# Patient Record
Sex: MALE | Race: OTHER | NOT HISPANIC OR LATINO
[De-identification: names, ages, dates, MRNs, and addresses within clinical notes are randomized per-mention and may not be internally consistent; named-entity substitution may affect disease eponyms.]

---

## 2020-06-12 ENCOUNTER — TRANSCRIPTION ENCOUNTER (OUTPATIENT)
Age: 40
End: 2020-06-12

## 2020-07-21 ENCOUNTER — TRANSCRIPTION ENCOUNTER (OUTPATIENT)
Age: 40
End: 2020-07-21

## 2020-12-04 ENCOUNTER — TRANSCRIPTION ENCOUNTER (OUTPATIENT)
Age: 40
End: 2020-12-04

## 2021-06-09 ENCOUNTER — INPATIENT (INPATIENT)
Facility: HOSPITAL | Age: 41
LOS: 1 days | Discharge: HOME | DRG: 65 | End: 2021-06-11
Attending: PSYCHIATRY & NEUROLOGY | Admitting: PSYCHIATRY & NEUROLOGY
Payer: COMMERCIAL

## 2021-06-09 VITALS
HEART RATE: 73 BPM | RESPIRATION RATE: 20 BRPM | TEMPERATURE: 98 F | SYSTOLIC BLOOD PRESSURE: 113 MMHG | DIASTOLIC BLOOD PRESSURE: 76 MMHG | WEIGHT: 179.9 LBS | HEIGHT: 67 IN | OXYGEN SATURATION: 97 %

## 2021-06-09 DIAGNOSIS — R26.9 UNSPECIFIED ABNORMALITIES OF GAIT AND MOBILITY: ICD-10-CM

## 2021-06-09 LAB
ALBUMIN SERPL ELPH-MCNC: 4.6 G/DL — SIGNIFICANT CHANGE UP (ref 3.3–5)
ALP SERPL-CCNC: 65 U/L — SIGNIFICANT CHANGE UP (ref 40–120)
ALT FLD-CCNC: 23 U/L — SIGNIFICANT CHANGE UP (ref 10–45)
ANION GAP SERPL CALC-SCNC: 14 MMOL/L — SIGNIFICANT CHANGE UP (ref 5–17)
APTT BLD: 30.2 SEC — SIGNIFICANT CHANGE UP (ref 27.5–35.5)
AST SERPL-CCNC: 22 U/L — SIGNIFICANT CHANGE UP (ref 10–40)
BASOPHILS # BLD AUTO: 0.02 K/UL — SIGNIFICANT CHANGE UP (ref 0–0.2)
BASOPHILS NFR BLD AUTO: 0.3 % — SIGNIFICANT CHANGE UP (ref 0–2)
BILIRUB SERPL-MCNC: 0.6 MG/DL — SIGNIFICANT CHANGE UP (ref 0.2–1.2)
BUN SERPL-MCNC: 16 MG/DL — SIGNIFICANT CHANGE UP (ref 7–23)
CALCIUM SERPL-MCNC: 9.5 MG/DL — SIGNIFICANT CHANGE UP (ref 8.4–10.5)
CHLORIDE SERPL-SCNC: 101 MMOL/L — SIGNIFICANT CHANGE UP (ref 96–108)
CO2 SERPL-SCNC: 24 MMOL/L — SIGNIFICANT CHANGE UP (ref 22–31)
CREAT SERPL-MCNC: 1.01 MG/DL — SIGNIFICANT CHANGE UP (ref 0.5–1.3)
EOSINOPHIL # BLD AUTO: 0.03 K/UL — SIGNIFICANT CHANGE UP (ref 0–0.5)
EOSINOPHIL NFR BLD AUTO: 0.5 % — SIGNIFICANT CHANGE UP (ref 0–6)
GLUCOSE SERPL-MCNC: 84 MG/DL — SIGNIFICANT CHANGE UP (ref 70–99)
HCT VFR BLD CALC: 41.7 % — SIGNIFICANT CHANGE UP (ref 39–50)
HGB BLD-MCNC: 14.7 G/DL — SIGNIFICANT CHANGE UP (ref 13–17)
IMM GRANULOCYTES NFR BLD AUTO: 0.2 % — SIGNIFICANT CHANGE UP (ref 0–1.5)
INR BLD: 1.07 RATIO — SIGNIFICANT CHANGE UP (ref 0.88–1.16)
LYMPHOCYTES # BLD AUTO: 1.45 K/UL — SIGNIFICANT CHANGE UP (ref 1–3.3)
LYMPHOCYTES # BLD AUTO: 25 % — SIGNIFICANT CHANGE UP (ref 13–44)
MCHC RBC-ENTMCNC: 29.9 PG — SIGNIFICANT CHANGE UP (ref 27–34)
MCHC RBC-ENTMCNC: 35.3 GM/DL — SIGNIFICANT CHANGE UP (ref 32–36)
MCV RBC AUTO: 84.8 FL — SIGNIFICANT CHANGE UP (ref 80–100)
MONOCYTES # BLD AUTO: 0.35 K/UL — SIGNIFICANT CHANGE UP (ref 0–0.9)
MONOCYTES NFR BLD AUTO: 6 % — SIGNIFICANT CHANGE UP (ref 2–14)
NEUTROPHILS # BLD AUTO: 3.94 K/UL — SIGNIFICANT CHANGE UP (ref 1.8–7.4)
NEUTROPHILS NFR BLD AUTO: 68 % — SIGNIFICANT CHANGE UP (ref 43–77)
NRBC # BLD: 0 /100 WBCS — SIGNIFICANT CHANGE UP (ref 0–0)
PLATELET # BLD AUTO: 240 K/UL — SIGNIFICANT CHANGE UP (ref 150–400)
POTASSIUM SERPL-MCNC: 3.9 MMOL/L — SIGNIFICANT CHANGE UP (ref 3.5–5.3)
POTASSIUM SERPL-SCNC: 3.9 MMOL/L — SIGNIFICANT CHANGE UP (ref 3.5–5.3)
PROT SERPL-MCNC: 7.2 G/DL — SIGNIFICANT CHANGE UP (ref 6–8.3)
PROTHROM AB SERPL-ACNC: 12.8 SEC — SIGNIFICANT CHANGE UP (ref 10.6–13.6)
RBC # BLD: 4.92 M/UL — SIGNIFICANT CHANGE UP (ref 4.2–5.8)
RBC # FLD: 12.5 % — SIGNIFICANT CHANGE UP (ref 10.3–14.5)
SARS-COV-2 RNA SPEC QL NAA+PROBE: SIGNIFICANT CHANGE UP
SODIUM SERPL-SCNC: 139 MMOL/L — SIGNIFICANT CHANGE UP (ref 135–145)
TROPONIN T, HIGH SENSITIVITY RESULT: <6 NG/L — SIGNIFICANT CHANGE UP (ref 0–51)
WBC # BLD: 5.8 K/UL — SIGNIFICANT CHANGE UP (ref 3.8–10.5)
WBC # FLD AUTO: 5.8 K/UL — SIGNIFICANT CHANGE UP (ref 3.8–10.5)

## 2021-06-09 PROCEDURE — 70450 CT HEAD/BRAIN W/O DYE: CPT | Mod: 26,MA,59

## 2021-06-09 PROCEDURE — 70498 CT ANGIOGRAPHY NECK: CPT | Mod: 26,MA

## 2021-06-09 PROCEDURE — 99291 CRITICAL CARE FIRST HOUR: CPT

## 2021-06-09 PROCEDURE — 93010 ELECTROCARDIOGRAM REPORT: CPT

## 2021-06-09 PROCEDURE — 70496 CT ANGIOGRAPHY HEAD: CPT | Mod: 26,MA

## 2021-06-09 RX ORDER — METOCLOPRAMIDE HCL 10 MG
10 TABLET ORAL ONCE
Refills: 0 | Status: COMPLETED | OUTPATIENT
Start: 2021-06-09 | End: 2021-06-09

## 2021-06-09 RX ORDER — ATORVASTATIN CALCIUM 80 MG/1
40 TABLET, FILM COATED ORAL AT BEDTIME
Refills: 0 | Status: DISCONTINUED | OUTPATIENT
Start: 2021-06-09 | End: 2021-06-11

## 2021-06-09 RX ORDER — ALTEPLASE 100 MG
68.1 KIT INTRAVENOUS ONCE
Refills: 0 | Status: DISCONTINUED | OUTPATIENT
Start: 2021-06-09 | End: 2021-06-09

## 2021-06-09 RX ORDER — CLOPIDOGREL BISULFATE 75 MG/1
300 TABLET, FILM COATED ORAL ONCE
Refills: 0 | Status: COMPLETED | OUTPATIENT
Start: 2021-06-09 | End: 2021-06-09

## 2021-06-09 RX ORDER — SODIUM CHLORIDE 9 MG/ML
1000 INJECTION INTRAMUSCULAR; INTRAVENOUS; SUBCUTANEOUS
Refills: 0 | Status: DISCONTINUED | OUTPATIENT
Start: 2021-06-09 | End: 2021-06-11

## 2021-06-09 RX ORDER — ENOXAPARIN SODIUM 100 MG/ML
40 INJECTION SUBCUTANEOUS DAILY
Refills: 0 | Status: DISCONTINUED | OUTPATIENT
Start: 2021-06-10 | End: 2021-06-11

## 2021-06-09 RX ORDER — ASPIRIN/CALCIUM CARB/MAGNESIUM 324 MG
650 TABLET ORAL ONCE
Refills: 0 | Status: DISCONTINUED | OUTPATIENT
Start: 2021-06-09 | End: 2021-06-09

## 2021-06-09 RX ORDER — ALTEPLASE 100 MG
7.6 KIT INTRAVENOUS ONCE
Refills: 0 | Status: DISCONTINUED | OUTPATIENT
Start: 2021-06-09 | End: 2021-06-09

## 2021-06-09 RX ORDER — CLOPIDOGREL BISULFATE 75 MG/1
75 TABLET, FILM COATED ORAL DAILY
Refills: 0 | Status: DISCONTINUED | OUTPATIENT
Start: 2021-06-10 | End: 2021-06-11

## 2021-06-09 RX ORDER — ASPIRIN/CALCIUM CARB/MAGNESIUM 324 MG
81 TABLET ORAL DAILY
Refills: 0 | Status: DISCONTINUED | OUTPATIENT
Start: 2021-06-10 | End: 2021-06-11

## 2021-06-09 RX ORDER — ACETAMINOPHEN 500 MG
650 TABLET ORAL ONCE
Refills: 0 | Status: COMPLETED | OUTPATIENT
Start: 2021-06-09 | End: 2021-06-09

## 2021-06-09 RX ORDER — SODIUM CHLORIDE 9 MG/ML
1000 INJECTION INTRAMUSCULAR; INTRAVENOUS; SUBCUTANEOUS ONCE
Refills: 0 | Status: COMPLETED | OUTPATIENT
Start: 2021-06-09 | End: 2021-06-09

## 2021-06-09 RX ADMIN — Medication 10 MILLIGRAM(S): at 14:55

## 2021-06-09 RX ADMIN — SODIUM CHLORIDE 1000 MILLILITER(S): 9 INJECTION INTRAMUSCULAR; INTRAVENOUS; SUBCUTANEOUS at 14:56

## 2021-06-09 RX ADMIN — ATORVASTATIN CALCIUM 40 MILLIGRAM(S): 80 TABLET, FILM COATED ORAL at 22:23

## 2021-06-09 RX ADMIN — Medication 650 MILLIGRAM(S): at 14:56

## 2021-06-09 RX ADMIN — Medication 650 MILLIGRAM(S): at 15:26

## 2021-06-09 RX ADMIN — CLOPIDOGREL BISULFATE 300 MILLIGRAM(S): 75 TABLET, FILM COATED ORAL at 14:56

## 2021-06-09 NOTE — ED PROVIDER NOTE - CLINICAL SUMMARY MEDICAL DECISION MAKING FREE TEXT BOX
40yo M no pmhx presents with sudden onset dizziness, imbalance, headache, code stroke called, neuro at bedside, will get emergent CT scan, labs sent

## 2021-06-09 NOTE — ED ADULT NURSE REASSESSMENT NOTE - NS ED NURSE REASSESS COMMENT FT1
Patient refused TPA at this time. MD Al, MD Doan, MD Limon at bedside to witness patients decision. Risks and benefits discussed thoroughly with patient and patients wife. Patient verbalized understanding.

## 2021-06-09 NOTE — H&P ADULT - ATTENDING COMMENTS
code stroke called in ED and neurolgoy emergently asssesed patient.   Briefly 42 yo RH M with no hx p/w HA, dizziness and subtle R dysmetria. NIHSS3. CTH with L thalalmic hyupodensity, CTA H/N and CTP unremarkable. tpa offered but after discussion with family patient refused. Utox neg.   possible small  vessel infarct vs tia  - Dual antiplatelet therapy with ASA 81mg PO daily and Clopidogrel 75mg PO daily x 3 weeks followed by monotherapy with ASA 81mg PO daily.  - High dose statin therapy - atorvastatin 40mg PO daily. LDL goal <70mg/dL.  - MRI brain w/o  - Hemoglobin A1c and lipid panel  - TTE  - telemetry  - PT/OT/SS/SLP, OOBC  - permissive HTN, -180mmHg x 24hours now can be normortensive   - check FS, glucose control <180  - GI/DVT ppx  - Counseling on diet, exercise, and medication adherence was done  - Counseling on smoking cessation and alcohol consumption offered when appropriate.  - Pain assessed and judicious use of narcotics when appropriate was discussed.    - Stroke education given when appropriate.  - family updated several times throughout hosp course   - Importance of fall prevention discussed.   - Differential diagnosis and plan of care discussed with patient and/or family and primary team  - Thank you for allowing me to participate in the care of this patient. Call with questions.   - possible d/c today after MRI and TTE. defer APLS labs and ESSENCE unless embolic stroke is identified.  Buck Marquez MD  Vascular Neurology code stroke called in ED and neurolgoy emergently asssesed patient.   Briefly 42 yo RH M with no hx p/w HA, dizziness and subtle R dysmetria. NIHSS3. CTH with L thalalmic hyupodensity, CTA H/N and CTP unremarkable. tpa offered but after discussion with family patient refused. Utox neg. this AM back to baseline   possible small  vessel infarct vs tia  - Dual antiplatelet therapy with ASA 81mg PO daily and Clopidogrel 75mg PO daily x 3 weeks followed by monotherapy with ASA 81mg PO daily.  - High dose statin therapy - atorvastatin 40mg PO daily. LDL goal <70mg/dL.  - MRI brain w/o  - Hemoglobin A1c and lipid panel  - TTE  - telemetry  - PT/OT/SS/SLP, OOBC  - permissive HTN, -180mmHg x 24hours now can be normortensive   - check FS, glucose control <180  - GI/DVT ppx  - Counseling on diet, exercise, and medication adherence was done  - Counseling on smoking cessation and alcohol consumption offered when appropriate.  - Pain assessed and judicious use of narcotics when appropriate was discussed.    - Stroke education given when appropriate.  - family updated several times throughout hosp course   - Importance of fall prevention discussed.   - Differential diagnosis and plan of care discussed with patient and/or family and primary team  - Thank you for allowing me to participate in the care of this patient. Call with questions.   - possible d/c today after MRI and TTE. defer APLS labs and ESSENCE unless embolic stroke is identified.  Buck Marquez MD  Vascular Neurology

## 2021-06-09 NOTE — ED PROVIDER NOTE - ATTENDING CONTRIBUTION TO CARE
Attending Statement (ALICIA Al MD):    HPI: 40y/o M with no reported medical comorbidities, presents to the ED for sudden onset dizziness and headache which began about 2.5 hours agoe; mild/worsening HA since onset, bifrontal, R>L, no n/v. Dizziness described as room spinning sensation and feeling off balance.  No prior episodes in past, was feeling well prior to onset of symptoms.    Review of Systems:  -General: no fever or chills  -ENT: no congestion, no difficulty swallowing  -Pulmonary: no cough, no shortness of breath  -Cardiac: no chest pain, no palpitations  -Gastrointestinal: no abdominal pain, no nausea, no vomiting, and no diarrhea.  -Genitourinary: no blood or pain with urination  -Musculoskeletal: no back or neck pain  -Skin: no rashes  -Endocrine: No h/o diabetes or thyroid disease  -Neurologic: see hpi    All else negative unless otherwise specified elsewhere in this note.    PSH/PMH as noted above    On Physical Exam:  General: well appearing, in NAD, speaking clearly in full sentences and without difficulty; cooperative with exam  HEENT: PERRL, MMM  Neck: no neck tenderness, no nuchal rigidity  Cardiac: normal s1, s2; RRR; no MGR  Lungs: CTABL  Abdomen: soft nontender/nondistended  : no bladder tenderness or distension  Skin: intact, no rash  Extremities: no peripheral edema, no gross deformities  Neuro: wide based gait observed. CN II-XII grossly intact.  5/5 str in upper and lower extremities grossly. Coordination with upper extremities grossly intact with finger-nose; lower extremities ? difficulty with coordination.  A&OX3    MDM: CODE STROKE initiated given symptoms, onset; initial eval with neuro resident in CT hallway; then evaluated in gold shortly after; CT/CTA with no evidence of bleed/aneurysm; however, noted ? small microvascular disease and tiny chronic appearing lacunar infarct in left thalamus; given concern for CVA, young age, and evidence of prior CVA, neuro/stkoe service recommends TPA; mixing TPA for administration, but after risk/benefits, patient declined TPA; screening labs sent as part of stroke work-up and to be admitted to stroke service for further evaluation/management.

## 2021-06-09 NOTE — CHART NOTE - NSCHARTNOTEFT_GEN_A_CORE
Obtain screening lower extremity venous ultrasound in patients who meet 1 or more of the following criteria as patient is high risk for DVT/PE on admission:   [] History of DVT/PE  []Hypercoagulable states (Factor V Leiden, Cancer, OCP, etc. )  []Prolonged immobility (hemiplegia/hemiparesis/post operative or any other extended immobilization)  [] Transferred from outside facility (Rehab or Long term care)  [x] Age </= to 50

## 2021-06-09 NOTE — H&P ADULT - HISTORY OF PRESENT ILLNESS
HPI:  Chema Valdovinos is a 41 year old RH male with no reported past medical history who is presenting for dizziness and a headache. At baseline he ambulates without assistive devices is oriented to all modalities and works. 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 He stated that he felt normal at 1100. He stated that he has had dizziness in the past several months ago which was similar in nature. He stated that his dizziness is persistent and he describes it as a room spinning sensation. He denied any nausea or vomiting. Noted that he felt that he was so dizzy that he needed to lie down to make himself feel better. Stated that he felt that his balance was off but he denied sensations that he was going to fall and denied falls or head trauma. Describes his headache as a pressure like headache which is on the R side of his head, "like my head is in a vice". Denies photophobia/phonophobia. Denied recent illnesses or sick contacts. Does not take any medications.     (Stroke only)  NIHSS: 3  MRS: 0    ALLERGIES/INTOLERANCES:  Allergies  No Known Allergies    Intolerances    VITALS & EXAMINATION:  Vital Signs Last 24 Hrs  T(C): 36.7 (09 Jun 2021 13:04), Max: 36.7 (09 Jun 2021 13:04)  T(F): 98.1 (09 Jun 2021 13:04), Max: 98.1 (09 Jun 2021 13:04)  HR: 73 (09 Jun 2021 13:04) (73 - 73)  BP: 113/76 (09 Jun 2021 13:04) (113/76 - 113/76)  BP(mean): --  RR: 20 (09 Jun 2021 13:04) (20 - 20)  SpO2: 97% (09 Jun 2021 13:04) (97% - 97%)

## 2021-06-09 NOTE — ED ADULT NURSE REASSESSMENT NOTE - NS ED NURSE REASSESS COMMENT FT1
Neurology at bedside requesting TPA at this time. Patient A7Ox4 displaying right arm drift and right leg ataxia. Neurology team at bedside with patient and patients wife to discuss TPA and plan of care.

## 2021-06-09 NOTE — ED ADULT NURSE NOTE - NSIMPLEMENTINTERV_GEN_ALL_ED
Implemented All Universal Safety Interventions:  [place] to call system. Call bell, personal items and telephone within reach. Instruct patient to call for assistance. Room bathroom lighting operational. Non-slip footwear when patient is off stretcher. Physically safe environment: no spills, clutter or unnecessary equipment. Stretcher in lowest position, wheels locked, appropriate side rails in place.

## 2021-06-09 NOTE — CONSULT NOTE ADULT - SUBJECTIVE AND OBJECTIVE BOX
HPI:  Chema Valdovinos is a 41 year old RH male with no reported past medical history who is presenting for dizziness and a headaches. At baseline he ambulates without assistive devices is oriented to all modalities and works. 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 He stated that he felt normal at 1100. He stated that he has had dizziness in the past several months ago which was similar in nature. He stated that his dizziness is persistent and he describes it as a room spinning sensation. He denied any nausea or vomiting. Noted that he felt that he was so dizzy that he needed to lie down to make himself feel better. Stated that he felt that his balance was off but he denied sensations that he was going to fall and denied falls or head trauma. Describes his headache as a pressure like headache which is on the R side of his head, "like my head is in a vice". Denies photophobia/phonophobia. Denied recent illnesses or sick contacts. Does not take any medications.     (Stroke only)  NIHSS: 0  MRS: 0    REVIEW OF SYSTEMS    A 10-system ROS was performed and is negative except for those items noted above and/or in the HPI.    PAST MEDICAL & SURGICAL HISTORY:  No pertinent past medical history    No significant past surgical history      FAMILY HISTORY:  Denies history of seizures or strokes in the family    SOCIAL HISTORY:   T/E/D: rare cigar smoking, occasional glass of wine with wife, denies illicit drug use  Occupation:   Lives with: family    ALLERGIES/INTOLERANCES:  Allergies  No Known Allergies    Intolerances    VITALS & EXAMINATION:  Vital Signs Last 24 Hrs  T(C): 36.7 (09 Jun 2021 13:04), Max: 36.7 (09 Jun 2021 13:04)  T(F): 98.1 (09 Jun 2021 13:04), Max: 98.1 (09 Jun 2021 13:04)  HR: 73 (09 Jun 2021 13:04) (73 - 73)  BP: 113/76 (09 Jun 2021 13:04) (113/76 - 113/76)  BP(mean): --  RR: 20 (09 Jun 2021 13:04) (20 - 20)  SpO2: 97% (09 Jun 2021 13:04) (97% - 97%)    General:  Constitutional: Appears stated age, in no apparent distress including pain  Head: Normocephalic & atraumatic.    Neurological (>12):  MS: Awake, alert, oriented to person, place, situation, time. Normal affect. Follows all commands.    Language: Speech is clear, fluent with good repetition & comprehension.    CNs: PERRLA (R = 3mm, L = 3mm). VF intact in all 4 quadrants. EOMI no nystagmus. V1-3 intact to LT, well developed masseter muscles b/l. No facial asymmetry b/l, full eye closure strength b/l. Symmetric palate elevation in midline. Shoulder shrug intact b/l. Tongue midline, normal movements, no atrophy.    Motor: Normal muscle bulk & tone. No noticeable tremor or seizure. No pronator drift.              Deltoid	Biceps	Triceps	   R	5	5	5	5	  L	5	5	5	5    	H-Flex	H-Ext	K-Flex	K-Ext	D-Flex	P-Flex  R	5	5	5	5	5	5	   L	5	5	5	5	5	5	     Sensation: Intact to LT b/l throughout.     Reflexes:              Biceps(C5)       BR(C6)     Triceps(C7)               Patellar(L4)    Achilles(S1)    Plantar Resp  R	2	          2	             2		        2		    2		Down   L	2	          2	             2		        2		    2		Down     Coordination: No dysmetria to FTN    Gait: Cautious gait but no overt ataxia.     LABORATORY:  CBC                       14.7   5.80  )-----------( 240      ( 09 Jun 2021 13:22 )             41.7     Chem       LFTs   Coagulopathy   Lipid Panel   A1c   Cardiac enzymes     U/A   CSF  Immunological  Other    STUDIES & IMAGING:  Studies (EKG, EEG, EMG, etc):     Radiology (XR, CT, MR, U/S, TTE/ESSENCE):

## 2021-06-09 NOTE — H&P ADULT - NSHPPHYSICALEXAM_GEN_ALL_CORE
General:  Constitutional: Appears stated age, in no apparent distress including pain  Head: Normocephalic & atraumatic.    Neurological (>12):  MS: Awake, alert, oriented to person, place, situation, time. Normal affect. Follows all commands.    Language: Speech is clear, fluent with good repetition & comprehension.    CNs: PERRLA (R = 3mm, L = 3mm). VF intact in all 4 quadrants. EOMI no nystagmus. V1-3 intact to LT, well developed masseter muscles b/l. No facial asymmetry b/l, full eye closure strength b/l. Symmetric palate elevation in midline. Shoulder shrug intact b/l. Tongue midline, normal movements, no atrophy.    Motor: Normal muscle bulk & tone. No noticeable tremor or seizure. Mild R drift. Noted orbiting around R hand.               Deltoid	Biceps	Triceps	   R	5	5	5	5	  L	5	5	5	5    	H-Flex	H-Ext	K-Flex	K-Ext	D-Flex	P-Flex  R	5	5	5	5	5	5	   L	5	5	5	5	5	5	     Sensation: Intact to LT b/l throughout.     Reflexes:              Biceps(C5)       BR(C6)     Triceps(C7)               Patellar(L4)    Achilles(S1)    Plantar Resp  R	2	          2	             2		        2		    2		Down   L	2	          2	             2		        2		    2		Down     Coordination: Subtle R FTN and R HTS ataxia.     Gait: Cautious gait but no overt ataxia. Unable to stand on R leg. Able to stand on L leg.

## 2021-06-09 NOTE — STROKE CODE NOTE - CT PERFORMED
I called pt to see if anyone has called her back regarding her UTI concern. She said no. I explained that I have left messages for Dr Alvarez, Haven White, PCC RN and Roderick Moreno, PCC manager and that I also  CCed her surgeon, Dr Young, on one of the messages. Pt will call the Primary care Clinic line and try to get through.    09-Jun-2021 09-Jun-2021 13:32

## 2021-06-09 NOTE — ED PROVIDER NOTE - PROGRESS NOTE DETAILS
Attending note (Servando): patient remains resting comfortably in stretcher, awaiting admission to stroke unit.  patient was offered tpa (ED and neuro/stroke services d/w patient risks/benefits); patient ultimately declined TPA, verbalized understanding of risks of not treating; will be admitted to stroke unit.

## 2021-06-09 NOTE — ED ADULT NURSE NOTE - CHIEF COMPLAINT QUOTE
headache and "room spinning" dizziness that started this morning  denies N/V, visual changes  code stroke called in triage

## 2021-06-09 NOTE — H&P ADULT - NSHPLABSRESULTS_GEN_ALL_CORE
LABORATORY:  CBC                       14.7   5.80  )-----------( 240      ( 09 Jun 2021 13:22 )             41.7     06-09    139  |  101  |  16  ----------------------------<  84  3.9   |  24  |  1.01    Ca    9.5      09 Jun 2021 13:22    TPro  7.2  /  Alb  4.6  /  TBili  0.6  /  DBili  x   /  AST  22  /  ALT  23  /  AlkPhos  65  06-09      STUDIES & IMAGING:  Studies (EKG, EEG, EMG, etc):     Radiology (XR, CT, MR, U/S, TTE/ESSENCE):    < from: CT Brain Stroke Protocol (06.09.21 @ 13:41) >    IMPRESSION:  HEAD CT: Mild volume loss, microvascular disease, no acute hemorrhage or midline shift.  Suspected old tiny lacunar infarct in the left thalamus.    < end of copied text >

## 2021-06-09 NOTE — ED ADULT NURSE REASSESSMENT NOTE - NS ED NURSE REASSESS COMMENT FT1
Report received from ROSEANNE Mcknight. Pt A+Ox3, VSS, neuro exam intact. Pt aware of plan of care, admitted to Stroke Unit for unsteady gait. Report given to ROSEANNE Chan in Stroke Unit.

## 2021-06-09 NOTE — ED ADULT TRIAGE NOTE - CHIEF COMPLAINT QUOTE
headache and "room spinning" dizziness that started this morning  denies N/V, visual changes headache and "room spinning" dizziness that started this morning  denies N/V, visual changes  code stroke called in triage

## 2021-06-09 NOTE — ED PROVIDER NOTE - OBJECTIVE STATEMENT
42yo M no pmhx presents for sudden onset dizziness at 11AM approx 2.5 hours ago and frontal headache worse on R side. Describes dizziness as room spinning and feeling off balance. Denies similar episodes in past. No fever chills cp sob n/v/d abd pain.

## 2021-06-09 NOTE — H&P ADULT - ASSESSMENT
Chema Valdovinos is a 41 year old RH male with no reported past medical history who is presenting for dizziness and a headaches.    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    Risks, benefits, and adverse reactions associated with IV tPA including hemorrhagic stroke were discussed with patient and family members in detail. Patient and family members adamantly refused IV tPA, even though treatment with IV tPA was recommended as benefits are thought to outweigh potential risks.    No tpa as patient refused.  No endovascular as no LVO noted  Will start aspirin.     Recs:  Meds  [] Aspirin 81mg and Plavix 300mg loading dose then [id number] for 3 weeks followed by ASA 81 alone per CHANCE trial  [] Atorvastatin 40, titrate to LDL<70  [] DVT prophylaxis    Imaging  [] MRI brain w/o contrast to look at the extent and distribution of the stroke   [] TTE with bubble study and telemetry to look for a cardiac source of embolism  [] may likely need ESSENCE  [] +/- ILR depending on TTE    Labs  [] HbA1C and Lipid Panel  [] APLS labs    Other  [] Telemonitoring;  Neurochecks and Vital signs per unit protocol  [] Permissive HTN up to 220/120 mmHg for first 24 hours after the symptom onset followed by gradual normotension.   [] BGM goals 140-180  [] PT/OT evaluation  [] NPO until clears Dysphagia screen, otherwise Swallow evaluation  [] Stroke education provided    Case discussed and disposition finalized with stroke fellow, Dr. Limon. Further recommendations to follow upon review by stroke attending, Dr. Marquez.    Chema Valdovinos is a 41 year old RH male with no reported past medical history who is presenting for dizziness and a headaches.    Impression: R ataxic hemiparesis in the setting of L sided brain dysfunction likely L corona radiata or L brachium pontis stroke likely mechanism small vessel disease.    Risks, benefits, and adverse reactions associated with IV tPA including hemorrhagic stroke were discussed with patient and family members in detail. Patient and family members adamantly refused IV tPA, even though treatment with IV tPA was recommended as benefits are thought to outweigh potential risks.    No tpa as patient refused.  No endovascular as no LVO noted  Will start aspirin.     Recs:  Meds  [] Aspirin 81mg and Plavix 300mg loading dose then [id number] for 3 weeks followed by ASA 81 alone per CHANCE trial  [] Atorvastatin 40, titrate to LDL<70  [] DVT prophylaxis    Imaging  [] MRI brain w/o contrast to look at the extent and distribution of the stroke   [] TTE with bubble study and telemetry to look for a cardiac source of embolism  [] may likely need ESSENCE  [] +/- ILR depending on TTE    Labs  [] HbA1C and Lipid Panel  [] APLS labs    Other  [] Telemonitoring;  Neurochecks and Vital signs per unit protocol  [] Permissive HTN up to 220/120 mmHg for first 24 hours after the symptom onset followed by gradual normotension.   [] BGM goals 140-180  [] PT/OT evaluation  [] NPO until clears Dysphagia screen, otherwise Swallow evaluation  [] Stroke education provided    Case discussed and disposition finalized with stroke fellow, Dr. Limon. Further recommendations to follow upon review by stroke attending, Dr. Marquez.

## 2021-06-09 NOTE — ED PROVIDER NOTE - CRITICAL CARE ATTENDING CONTRIBUTION TO CARE
Attending Statement (ALICIA Al MD):    HPI: 42y/o M with no reported medical comorbidities, presents to the ED for sudden onset dizziness and headache which began about 2.5 hours agoe; mild/worsening HA since onset, bifrontal, R>L, no n/v. Dizziness described as room spinning sensation and feeling off balance.  No prior episodes in past, was feeling well prior to onset of symptoms.    Review of Systems:  -General: no fever or chills  -ENT: no congestion, no difficulty swallowing  -Pulmonary: no cough, no shortness of breath  -Cardiac: no chest pain, no palpitations  -Gastrointestinal: no abdominal pain, no nausea, no vomiting, and no diarrhea.  -Genitourinary: no blood or pain with urination  -Musculoskeletal: no back or neck pain  -Skin: no rashes  -Endocrine: No h/o diabetes or thyroid disease  -Neurologic: see hpi    All else negative unless otherwise specified elsewhere in this note.    PSH/PMH as noted above    On Physical Exam:  General: well appearing, in NAD, speaking clearly in full sentences and without difficulty; cooperative with exam  HEENT: PERRL, MMM  Neck: no neck tenderness, no nuchal rigidity  Cardiac: normal s1, s2; RRR; no MGR  Lungs: CTABL  Abdomen: soft nontender/nondistended  : no bladder tenderness or distension  Skin: intact, no rash  Extremities: no peripheral edema, no gross deformities  Neuro: wide based gait observed. CN II-XII grossly intact.  5/5 str in upper and lower extremities grossly. Coordination with upper extremities grossly intact with finger-nose; lower extremities ? difficulty with coordination.  A&OX3    MDM: CODE STROKE initiated given symptoms, onset; initial eval with neuro resident in CT hallway; then evaluated in gold shortly after; CT/CTA with no evidence of bleed/aneurysm; however, noted ? small microvascular disease and tiny chronic appearing lacunar infarct in left thalamus; given concern for CVA, young age, and evidence of prior CVA, neuro/stoke service recommends TPA; mixing TPA for administration, but after risk/benefits, patient declined TPA; screening labs sent as part of stroke work-up and to be admitted to stroke service for further evaluation/management.

## 2021-06-09 NOTE — H&P ADULT - NSHPSOCIALHISTORY_GEN_ALL_CORE
SOCIAL HISTORY:   T/E/D: rare cigar smoking, occasional glass of wine with wife, denies illicit drug use  Occupation: [employer]  Lives with: family

## 2021-06-09 NOTE — ED ADULT NURSE NOTE - OBJECTIVE STATEMENT
40y/o male presented to the ED from work with complaint of headache and weakness. A&Ox4, ambulatory at baseline. No significant PMH. 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 patient states that he became increasingly week and began to have unsteady gate. Patient states that he woke up normal. CODE stroke initiated at [facility] at 13.15. Patient 40y/o male presented to the ED from work with complaint of headache and weakness. A&Ox4, ambulatory at baseline. No significant PMH. 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 patient states that he became increasingly week and began to have unsteady gate. Patient states that he woke up normal. CODE stroke initiated at [facility] at 13.15. Patient is A&Ox4. Face is symmetrical. PERRL 3mmB. Speech is clear. Patient is moving all extremities with 5/5 strength. Upon arrival patient unable to ambulate with steady gate, requiring significant assistance. Right arm drift noted, right leg ataxia noted. NIH score 3 upon arrival. Patient also states that he has been under a great amount of stress both at work and at home, not sleeping well and reports irratic blood pressure.

## 2021-06-10 ENCOUNTER — TRANSCRIPTION ENCOUNTER (OUTPATIENT)
Age: 41
End: 2021-06-10

## 2021-06-10 LAB
A1C WITH ESTIMATED AVERAGE GLUCOSE RESULT: 5.3 % — SIGNIFICANT CHANGE UP (ref 4–5.6)
ALBUMIN SERPL ELPH-MCNC: 4.1 G/DL — SIGNIFICANT CHANGE UP (ref 3.3–5)
ALP SERPL-CCNC: 52 U/L — SIGNIFICANT CHANGE UP (ref 40–120)
ALT FLD-CCNC: 23 U/L — SIGNIFICANT CHANGE UP (ref 10–45)
AMPHET UR-MCNC: NEGATIVE — SIGNIFICANT CHANGE UP
ANION GAP SERPL CALC-SCNC: 12 MMOL/L — SIGNIFICANT CHANGE UP (ref 5–17)
AST SERPL-CCNC: 23 U/L — SIGNIFICANT CHANGE UP (ref 10–40)
AT III ACT/NOR PPP CHRO: 85 % — SIGNIFICANT CHANGE UP (ref 85–135)
BARBITURATES UR SCN-MCNC: NEGATIVE — SIGNIFICANT CHANGE UP
BENZODIAZ UR-MCNC: NEGATIVE — SIGNIFICANT CHANGE UP
BILIRUB SERPL-MCNC: 0.9 MG/DL — SIGNIFICANT CHANGE UP (ref 0.2–1.2)
BUN SERPL-MCNC: 12 MG/DL — SIGNIFICANT CHANGE UP (ref 7–23)
CALCIUM SERPL-MCNC: 9.6 MG/DL — SIGNIFICANT CHANGE UP (ref 8.4–10.5)
CHLORIDE SERPL-SCNC: 105 MMOL/L — SIGNIFICANT CHANGE UP (ref 96–108)
CHOLEST SERPL-MCNC: 163 MG/DL — SIGNIFICANT CHANGE UP
CO2 SERPL-SCNC: 22 MMOL/L — SIGNIFICANT CHANGE UP (ref 22–31)
COCAINE METAB.OTHER UR-MCNC: NEGATIVE — SIGNIFICANT CHANGE UP
CREAT SERPL-MCNC: 0.83 MG/DL — SIGNIFICANT CHANGE UP (ref 0.5–1.3)
DRVVT SCREEN TO CONFIRM RATIO: SIGNIFICANT CHANGE UP
ESTIMATED AVERAGE GLUCOSE: 105 MG/DL — SIGNIFICANT CHANGE UP (ref 68–114)
GLUCOSE SERPL-MCNC: 103 MG/DL — HIGH (ref 70–99)
HCT VFR BLD CALC: 42.9 % — SIGNIFICANT CHANGE UP (ref 39–50)
HCYS SERPL-MCNC: 6.6 UMOL/L — SIGNIFICANT CHANGE UP
HDLC SERPL-MCNC: 40 MG/DL — LOW
HGB BLD-MCNC: 14.8 G/DL — SIGNIFICANT CHANGE UP (ref 13–17)
LA NT DPL PPP QL: 32 SEC — SIGNIFICANT CHANGE UP
LIPID PNL WITH DIRECT LDL SERPL: 108 MG/DL — HIGH
MAGNESIUM SERPL-MCNC: 2.2 MG/DL — SIGNIFICANT CHANGE UP (ref 1.6–2.6)
MCHC RBC-ENTMCNC: 29.1 PG — SIGNIFICANT CHANGE UP (ref 27–34)
MCHC RBC-ENTMCNC: 34.5 GM/DL — SIGNIFICANT CHANGE UP (ref 32–36)
MCV RBC AUTO: 84.4 FL — SIGNIFICANT CHANGE UP (ref 80–100)
METHADONE UR-MCNC: NEGATIVE — SIGNIFICANT CHANGE UP
NON HDL CHOLESTEROL: 123 MG/DL — SIGNIFICANT CHANGE UP
NORMALIZED SCT PPP-RTO: 0.86 RATIO — SIGNIFICANT CHANGE UP (ref 0–1.16)
NORMALIZED SCT PPP-RTO: SIGNIFICANT CHANGE UP
NRBC # BLD: 0 /100 WBCS — SIGNIFICANT CHANGE UP (ref 0–0)
OPIATES UR-MCNC: NEGATIVE — SIGNIFICANT CHANGE UP
OXYCODONE UR-MCNC: NEGATIVE — SIGNIFICANT CHANGE UP
PCP SPEC-MCNC: SIGNIFICANT CHANGE UP
PCP UR-MCNC: NEGATIVE — SIGNIFICANT CHANGE UP
PHOSPHATE SERPL-MCNC: 3 MG/DL — SIGNIFICANT CHANGE UP (ref 2.5–4.5)
PLATELET # BLD AUTO: 206 K/UL — SIGNIFICANT CHANGE UP (ref 150–400)
POTASSIUM SERPL-MCNC: 4.1 MMOL/L — SIGNIFICANT CHANGE UP (ref 3.5–5.3)
POTASSIUM SERPL-SCNC: 4.1 MMOL/L — SIGNIFICANT CHANGE UP (ref 3.5–5.3)
PROT C ACT/NOR PPP: 99 % — SIGNIFICANT CHANGE UP (ref 74–150)
PROT SERPL-MCNC: 6.7 G/DL — SIGNIFICANT CHANGE UP (ref 6–8.3)
RBC # BLD: 5.08 M/UL — SIGNIFICANT CHANGE UP (ref 4.2–5.8)
RBC # FLD: 12.4 % — SIGNIFICANT CHANGE UP (ref 10.3–14.5)
SODIUM SERPL-SCNC: 139 MMOL/L — SIGNIFICANT CHANGE UP (ref 135–145)
THC UR QL: NEGATIVE — SIGNIFICANT CHANGE UP
TRIGL SERPL-MCNC: 72 MG/DL — SIGNIFICANT CHANGE UP
WBC # BLD: 4.45 K/UL — SIGNIFICANT CHANGE UP (ref 3.8–10.5)
WBC # FLD AUTO: 4.45 K/UL — SIGNIFICANT CHANGE UP (ref 3.8–10.5)

## 2021-06-10 PROCEDURE — 93306 TTE W/DOPPLER COMPLETE: CPT | Mod: 26

## 2021-06-10 PROCEDURE — G0452: CPT | Mod: 26

## 2021-06-10 PROCEDURE — 70551 MRI BRAIN STEM W/O DYE: CPT | Mod: 26

## 2021-06-10 PROCEDURE — 74177 CT ABD & PELVIS W/CONTRAST: CPT | Mod: 26

## 2021-06-10 PROCEDURE — 93970 EXTREMITY STUDY: CPT | Mod: 26

## 2021-06-10 PROCEDURE — 99252 IP/OBS CONSLTJ NEW/EST SF 35: CPT

## 2021-06-10 PROCEDURE — 71260 CT THORAX DX C+: CPT | Mod: 26

## 2021-06-10 RX ADMIN — ENOXAPARIN SODIUM 40 MILLIGRAM(S): 100 INJECTION SUBCUTANEOUS at 11:26

## 2021-06-10 RX ADMIN — SODIUM CHLORIDE 50 MILLILITER(S): 9 INJECTION INTRAMUSCULAR; INTRAVENOUS; SUBCUTANEOUS at 00:18

## 2021-06-10 RX ADMIN — ATORVASTATIN CALCIUM 40 MILLIGRAM(S): 80 TABLET, FILM COATED ORAL at 21:45

## 2021-06-10 RX ADMIN — Medication 81 MILLIGRAM(S): at 11:26

## 2021-06-10 RX ADMIN — CLOPIDOGREL BISULFATE 75 MILLIGRAM(S): 75 TABLET, FILM COATED ORAL at 11:26

## 2021-06-10 NOTE — CONSULT NOTE ADULT - SUBJECTIVE AND OBJECTIVE BOX
Patient is a 41y old  Male who presents with a chief complaint of     Admission HPI:  Chema Valdovinos is a 41 year old RH male with no reported past medical history who is presenting for dizziness and a headache. At baseline he ambulates without assistive devices is oriented to all modalities and works. 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 He stated that he felt normal at 1100. He stated that he has had dizziness in the past several months ago which was similar in nature. He stated that his dizziness is persistent and he describes it as a room spinning sensation. He denied any nausea or vomiting. Noted that he felt that he was so dizzy that he needed to lie down to make himself feel better. Stated that he felt that his balance was off but he denied sensations that he was going to fall and denied falls or head trauma. Describes his headache as a pressure like headache which is on the R side of his head, "like my head is in a vice". Denies photophobia/phonophobia. Denied recent illnesses or sick contacts. Does not take any medications.     (Stroke only)  NIHSS: 3  MRS: 0      Interval History:  Neurology work up in progress  CT Brain Stroke Protocol (06.09.21 @ 13:41) >  HEAD CT: Mild volume loss, microvascular disease, no acute hemorrhage or midline shift.  Suspected old tiny lacunar infarct in the left thalamus.    REVIEW OF SYSTEMS: No chest pain, shortness of breath, nausea, vomiting or diarhea; other ROS neg     PAST MEDICAL & SURGICAL HISTORY  No pertinent past medical history  No significant past surgical history    FUNCTIONAL HISTORY:   Lives   Independent    CURRENT FUNCTIONAL STATUS:    FAMILY HISTORY   No pertinent family history in first degree relatives      MEDICATIONS   aspirin enteric coated 81 milliGRAM(s) Oral daily  atorvastatin 40 milliGRAM(s) Oral at bedtime  clopidogrel Tablet 75 milliGRAM(s) Oral daily  enoxaparin Injectable 40 milliGRAM(s) SubCutaneous daily  sodium chloride 0.9%. 1000 milliLiter(s) IV Continuous <Continuous>    ALLERGIES  No Known Allergies    VITALS  T(C): 36.6 (06-10-21 @ 07:21), Max: 36.7 (06-09-21 @ 13:04)  HR: 60 (06-10-21 @ 08:18) (55 - 73)  BP: 124/94 (06-10-21 @ 08:18) (105/77 - 136/77)  RR: 18 (06-10-21 @ 08:18) (13 - 20)  SpO2: 100% (06-10-21 @ 08:18) (97% - 100%)  Wt(kg): --    PHYSICAL EXAM  Constitutional - NAD, Comfortable  HEENT - NCAT, EOMI  Neck - Supple, No limited ROM  Chest - CTA bilaterally, No wheeze, No rhonchi, No crackles  Cardiovascular - RRR, S1S2, No murmurs  Abdomen - BS+, Soft, NTND  Extremities - No C/C/E, No calf tenderness   Neurologic Exam -                    Cognitive - Awake, Alert, AAO to self, place, date, year, situation     Communication - Fluent, No dysarthria, no aphasia     Cranial Nerves - CN 2-12 intact     Motor - No focal deficits      Sensory - Intact to LT     Reflexes - DTR Intact, No primitive reflexive  Psychiatric - Mood stable, Affect WNL    RECENT LABS/IMAGING  CBC Full  -  ( 10 Rogelio 2021 05:31 )  WBC Count : 4.45 K/uL  RBC Count : 5.08 M/uL  Hemoglobin : 14.8 g/dL  Hematocrit : 42.9 %  Platelet Count - Automated : 206 K/uL  Mean Cell Volume : 84.4 fl  Mean Cell Hemoglobin : 29.1 pg  Mean Cell Hemoglobin Concentration : 34.5 gm/dL  Auto Neutrophil # : x  Auto Lymphocyte # : x  Auto Monocyte # : x  Auto Eosinophil # : x  Auto Basophil # : x  Auto Neutrophil % : x  Auto Lymphocyte % : x  Auto Monocyte % : x  Auto Eosinophil % : x  Auto Basophil % : x    06-10    139  |  105  |  12  ----------------------------<  103<H>  4.1   |  22  |  0.83    Ca    9.6      10 Rogelio 2021 05:30  Phos  3.0     06-10  Mg     2.2     06-10    TPro  6.7  /  Alb  4.1  /  TBili  0.9  /  DBili  x   /  AST  23  /  ALT  23  /  AlkPhos  52  06-10    Impression:  40yo with headache/dizziness    Plan:  Continue work up per neurology  PT- ROM, Bed Mob, Transfers, Amb w AD and bracing as needed  OT- ADLs, bracing  SLP- Dysphagia eval and treat  Prec- Falls  DVT Prophylaxis- Lovenox  Skin- Turn q2 h  Dispo-  Patient is a 41y old  Male who presents with a chief complaint of     Admission HPI:  Chema Valdovinos is a 41 year old RH male with no reported past medical history who is presenting for dizziness and a headache. At baseline he ambulates without assistive devices is oriented to all modalities and works. 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 He stated that he felt normal at 1100. He stated that he has had dizziness in the past several months ago which was similar in nature. He stated that his dizziness is persistent and he describes it as a room spinning sensation. He denied any nausea or vomiting. Noted that he felt that he was so dizzy that he needed to lie down to make himself feel better. Stated that he felt that his balance was off but he denied sensations that he was going to fall and denied falls or head trauma. Describes his headache as a pressure like headache which is on the R side of his head, "like my head is in a vice". Denies photophobia/phonophobia. Denied recent illnesses or sick contacts. Does not take any medications.     (Stroke only)  NIHSS: 3  MRS: 0      Interval History:  Neurology work up in progress  CT Brain Stroke Protocol (06.09.21 @ 13:41) >  HEAD CT: Mild volume loss, microvascular disease, no acute hemorrhage or midline shift.  Suspected old tiny lacunar infarct in the left thalamus.    REVIEW OF SYSTEMS: Feels like he leans to the right, No chest pain, shortness of breath, nausea, vomiting or diarhea; other ROS neg     PAST MEDICAL & SURGICAL HISTORY  No pertinent past medical history  No significant past surgical history    FUNCTIONAL HISTORY:   Lives w family  PTA Independent and works as an     FAMILY HISTORY   No pertinent family history in first degree relatives      MEDICATIONS   aspirin enteric coated 81 milliGRAM(s) Oral daily  atorvastatin 40 milliGRAM(s) Oral at bedtime  clopidogrel Tablet 75 milliGRAM(s) Oral daily  enoxaparin Injectable 40 milliGRAM(s) SubCutaneous daily  sodium chloride 0.9%. 1000 milliLiter(s) IV Continuous <Continuous>    ALLERGIES  No Known Allergies    VITALS  T(C): 36.6 (06-10-21 @ 07:21), Max: 36.7 (06-09-21 @ 13:04)  HR: 60 (06-10-21 @ 08:18) (55 - 73)  BP: 124/94 (06-10-21 @ 08:18) (105/77 - 136/77)  RR: 18 (06-10-21 @ 08:18) (13 - 20)  SpO2: 100% (06-10-21 @ 08:18) (97% - 100%)  Wt(kg): --    PHYSICAL EXAM  Constitutional - NAD, Comfortable  HEENT - NCAT, EOMI  Neck - Supple, No limited ROM  Chest - CTA bilaterally, No wheeze, No rhonchi, No crackles  Cardiovascular - RRR, S1S2, No murmurs  Abdomen - BS+, Soft, NTND  Extremities - No C/C/E, No calf tenderness   Neurologic Exam -                    Cognitive - Awake, Alert, AAO to self, place, date, year, situation     Communication - Fluent, No dysarthria, no aphasia     Cranial Nerves - CN 2-12 intact     Motor - No focal deficits      Sensory - Intact to LT     Reflexes - DTR Intact, No primitive reflexive  Psychiatric - Mood stable, Affect WNL    RECENT LABS/IMAGING  CBC Full  -  ( 10 Rogelio 2021 05:31 )  WBC Count : 4.45 K/uL  RBC Count : 5.08 M/uL  Hemoglobin : 14.8 g/dL  Hematocrit : 42.9 %  Platelet Count - Automated : 206 K/uL  Mean Cell Volume : 84.4 fl  Mean Cell Hemoglobin : 29.1 pg  Mean Cell Hemoglobin Concentration : 34.5 gm/dL  Auto Neutrophil # : x  Auto Lymphocyte # : x  Auto Monocyte # : x  Auto Eosinophil # : x  Auto Basophil # : x  Auto Neutrophil % : x  Auto Lymphocyte % : x  Auto Monocyte % : x  Auto Eosinophil % : x  Auto Basophil % : x    06-10    139  |  105  |  12  ----------------------------<  103<H>  4.1   |  22  |  0.83    Ca    9.6      10 Rogelio 2021 05:30  Phos  3.0     06-10  Mg     2.2     06-10    TPro  6.7  /  Alb  4.1  /  TBili  0.9  /  DBili  x   /  AST  23  /  ALT  23  /  AlkPhos  52  06-10    Impression:  40yo with headache/dizziness    Plan:  Continue work up per neurology  PT- ROM, Bed Mob, Transfers, Amb w AD and bracing as needed  OT- ADLs, bracing  SLP- Dysphagia eval and treat  Prec- Falls  DVT Prophylaxis- Lovenox  Skin- Turn q2 h  Dispo- Home w outpt therapy when stable

## 2021-06-10 NOTE — PHYSICAL THERAPY INITIAL EVALUATION ADULT - PERTINENT HX OF CURRENT PROBLEM, REHAB EVAL
Pt is a 42 y/o male admitted with dizziness and headache. No PMH. 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 Pt states his balance felt off and he needed to lie to down make himself feel better. CT and CTA H/N negative. Pt is a 40 y/o male admitted with dizziness and headache. No PMH. 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 Pt states his balance felt off and he needed to lie to down make himself feel better. CT and CTA H/N negative. CT head demonstrates mild volume loss, microvascular disease, no acute hemorrhage or midline shift, suspected old tiny lacunar infarct in the left thalamus.

## 2021-06-10 NOTE — DISCHARGE NOTE PROVIDER - CARE PROVIDER_API CALL
Buck Marquez)  Neurology; Vascular Neurology  [address], [address]  [place] [address]  Phone: [phone]  Fax: [phone]  Follow Up Time: 1 week

## 2021-06-10 NOTE — OCCUPATIONAL THERAPY INITIAL EVALUATION ADULT - PERTINENT HX OF CURRENT PROBLEM, REHAB EVAL
41M At baseline he ambulates without assistive devices is oriented to all modalities and works. 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 Describes his headache as a pressure like headache which is on the R side of his head, "like my head is in a vice."

## 2021-06-10 NOTE — SPEECH LANGUAGE PATHOLOGY EVALUATION - SLP PERTINENT HISTORY OF CURRENT PROBLEM
H&P: 40 y/o RH M with no reported past medical history who is presenting for dizziness and a headache. At baseline he ambulates without assistive devices is oriented to all modalities and works. 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 He stated that he felt normal at 1100. He stated that he has had dizziness in the past several months ago which was similar in nature. He stated that his dizziness is persistent and he describes it as a room spinning sensation. Denied falls or head trauma. Describes his headache as a pressure like headache which is on the R side of his head, "like my head is in a vice". NIHSS: 3. CT HEAD revealed mild volume loss, microvascular disease, no acute hemorrhage or midline shift. Suspected old tiny lacunar infarct in the left thalamus. No tpa as patient refused. No endovascular as no LVO noted. Will start aspirin.

## 2021-06-10 NOTE — SPEECH LANGUAGE PATHOLOGY EVALUATION - COMMENTS
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 Rec: MRI brain w/o contrast to look at the extent and distribution of the stroke, TTE with bubble study and telemetry to look for a cardiac source of embolism, may likely need ESSENCE, /- ILR depending on TTE.    IMAGING:  CT HEAD: 6/9/21  IMPRESSION:  HEAD CT: Mild volume loss, microvascular disease, no acute hemorrhage or midline shift. Suspected old tiny lacunar infarct in the left thalamus.    Pt is unknown to this service.     Of note, pt passed dysphagia screen 6/9/21 at 13:55 and was started on a puree diet. 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 Rec: MRI brain w/o contrast to look at the extent and distribution of the stroke, TTE with bubble study and telemetry to look for a cardiac source of embolism, may likely need ESSENCE, /- ILR depending on TTE.    IMAGING:  CT HEAD: 6/9/21  IMPRESSION:  HEAD CT: Mild volume loss, microvascular disease, no acute hemorrhage or midline shift. Suspected old tiny lacunar infarct in the left thalamus.    Pt is unknown to this service.     Of note, pt passed dysphagia screen 6/9/21 at 13:55. Pt is on a regular texture diet. Results and recommendations d/w pt, RNEdilma, and NPDeirdre.

## 2021-06-10 NOTE — DIETITIAN INITIAL EVALUATION ADULT. - WEIGHT IN LBS
175 Consent (Near Eyelid Margin)/Introductory Paragraph: The rationale for Mohs was explained to the patient and consent was obtained. The risks, benefits and alternatives to therapy were discussed in detail. Specifically, the risks of ectropion or eyelid deformity, infection, scarring, bleeding, prolonged wound healing, incomplete removal, allergy to anesthesia, nerve injury and recurrence were addressed. Prior to the procedure, the treatment site was clearly identified and confirmed by the patient. All components of Universal Protocol/PAUSE Rule completed.

## 2021-06-10 NOTE — DIETITIAN INITIAL EVALUATION ADULT. - OTHER INFO
Pt seen for: Stroke Unit length of stay                                   GI issues: none           Last  BM: 6/8             Food Allergies/Intolerances: NKFA       Vitamins/Supplements: multivitamin, vitamin D  Wt hx: pt reports 10 lb wt gain over past year was 175 lb, now 185 lb                              Skin: no pressure injuries documented     Subjective/Objective: reports good appetite    Wt used for nutrition needs: dosing wt 6/9 185 lb

## 2021-06-10 NOTE — PHYSICAL THERAPY INITIAL EVALUATION ADULT - ADDITIONAL COMMENTS
Pt lives in a PH +5 steps to enter, first floor set up. Pt lives with his wife and 4 children. Pt has +tub shower and +walk in shower (upstairs). Pt works as an  and drives, pt was independent with all mobility prior to admission.

## 2021-06-10 NOTE — PROVIDER CONTACT NOTE (OTHER) - SITUATION
PA notified overnight with each SBP reading as almost all SBP readings out of parameter (slightly lower, see flowsheet).

## 2021-06-10 NOTE — DISCHARGE NOTE PROVIDER - NSDCCPCAREPLAN_GEN_ALL_CORE_FT
PRINCIPAL DISCHARGE DIAGNOSIS  Diagnosis: Stroke  Assessment and Plan of Treatment: Please follow up with neurologist in 1 week. Continue taking medications as prescribed. Monitor your blood pressure. Reduce fat, cholesterol and salt in your diet. Increase intake of fruits and vegetables. Limit alcohol to minimum and do not smoke. You may be at risk for falling, make changes to your home to help you walk easier. Keep up to date on vaccinations.  If you experience any symptoms of facial drooping, slurred speech, arm or leg weakness, severe headache, vision changes or any worsening symptoms, notify provider immediatley and return to ER.

## 2021-06-10 NOTE — DISCHARGE NOTE PROVIDER - NSDCMRMEDTOKEN_GEN_ALL_CORE_FT
aspirin 81 mg oral delayed release tablet: 1 tab(s) orally once a day  atorvastatin 40 mg oral tablet: 1 tab(s) orally once a day (at bedtime)  clopidogrel 75 mg oral tablet: 1 tab(s) orally once a day  Outpatient Occupational Therapy: Dx: Stroke  Outpatient Physical Therapy: Dx: Stroke

## 2021-06-10 NOTE — PATIENT PROFILE ADULT - VISION (WITH CORRECTIVE LENSES IF THE PATIENT USUALLY WEARS THEM):
past lasik surgery/Normal vision: sees adequately in most situations; can see medication labels, newsprint

## 2021-06-10 NOTE — DIETITIAN INITIAL EVALUATION ADULT. - PERTINENT MEDS FT
MEDICATIONS  (STANDING):  aspirin enteric coated 81 milliGRAM(s) Oral daily  atorvastatin 40 milliGRAM(s) Oral at bedtime  clopidogrel Tablet 75 milliGRAM(s) Oral daily  enoxaparin Injectable 40 milliGRAM(s) SubCutaneous daily  sodium chloride 0.9%. 1000 milliLiter(s) (50 mL/Hr) IV Continuous <Continuous>
done/left arm

## 2021-06-10 NOTE — OCCUPATIONAL THERAPY INITIAL EVALUATION ADULT - LIVES WITH, PROFILE
Pt lives with wife and children in a pvt home, steps to enter and none inside. Pt has walk in shower. I in all ADLs

## 2021-06-10 NOTE — SPEECH LANGUAGE PATHOLOGY EVALUATION - SLP DIAGNOSIS
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 Pt was seen for speech language evaluation which revealed expressive and receptive language skills WFL. Cognitive-linguistic skills WFL. No dysarthria. No need for skilled Speech Therapy services.

## 2021-06-11 ENCOUNTER — TRANSCRIPTION ENCOUNTER (OUTPATIENT)
Age: 41
End: 2021-06-11

## 2021-06-11 VITALS
DIASTOLIC BLOOD PRESSURE: 98 MMHG | SYSTOLIC BLOOD PRESSURE: 119 MMHG | RESPIRATION RATE: 12 BRPM | OXYGEN SATURATION: 99 % | HEART RATE: 56 BPM

## 2021-06-11 LAB
ANION GAP SERPL CALC-SCNC: 13 MMOL/L — SIGNIFICANT CHANGE UP (ref 5–17)
APCR PPP: 2.95 RATIO — SIGNIFICANT CHANGE UP
AT III ACT/NOR PPP CHRO: 99 % — SIGNIFICANT CHANGE UP (ref 85–135)
BUN SERPL-MCNC: 15 MG/DL — SIGNIFICANT CHANGE UP (ref 7–23)
CALCIUM SERPL-MCNC: 9.7 MG/DL — SIGNIFICANT CHANGE UP (ref 8.4–10.5)
CARDIOLIPIN AB SER-ACNC: NEGATIVE — SIGNIFICANT CHANGE UP
CARDIOLIPIN AB SER-ACNC: POSITIVE
CHLORIDE SERPL-SCNC: 104 MMOL/L — SIGNIFICANT CHANGE UP (ref 96–108)
CO2 SERPL-SCNC: 21 MMOL/L — LOW (ref 22–31)
CREAT SERPL-MCNC: 0.93 MG/DL — SIGNIFICANT CHANGE UP (ref 0.5–1.3)
DRVVT SCREEN TO CONFIRM RATIO: SIGNIFICANT CHANGE UP
GLUCOSE SERPL-MCNC: 108 MG/DL — HIGH (ref 70–99)
HCT VFR BLD CALC: 44.9 % — SIGNIFICANT CHANGE UP (ref 39–50)
HCYS SERPL-MCNC: 8.3 UMOL/L — SIGNIFICANT CHANGE UP
HGB BLD-MCNC: 15.5 G/DL — SIGNIFICANT CHANGE UP (ref 13–17)
LA NT DPL PPP QL: 31.3 SEC — SIGNIFICANT CHANGE UP
MCHC RBC-ENTMCNC: 29.1 PG — SIGNIFICANT CHANGE UP (ref 27–34)
MCHC RBC-ENTMCNC: 34.5 GM/DL — SIGNIFICANT CHANGE UP (ref 32–36)
MCV RBC AUTO: 84.2 FL — SIGNIFICANT CHANGE UP (ref 80–100)
NRBC # BLD: 0 /100 WBCS — SIGNIFICANT CHANGE UP (ref 0–0)
PLATELET # BLD AUTO: 232 K/UL — SIGNIFICANT CHANGE UP (ref 150–400)
POTASSIUM SERPL-MCNC: 3.9 MMOL/L — SIGNIFICANT CHANGE UP (ref 3.5–5.3)
POTASSIUM SERPL-SCNC: 3.9 MMOL/L — SIGNIFICANT CHANGE UP (ref 3.5–5.3)
PROT C ACT/NOR PPP: 111 % — SIGNIFICANT CHANGE UP (ref 74–150)
RBC # BLD: 5.33 M/UL — SIGNIFICANT CHANGE UP (ref 4.2–5.8)
RBC # FLD: 12.4 % — SIGNIFICANT CHANGE UP (ref 10.3–14.5)
SODIUM SERPL-SCNC: 138 MMOL/L — SIGNIFICANT CHANGE UP (ref 135–145)
WBC # BLD: 4.84 K/UL — SIGNIFICANT CHANGE UP (ref 3.8–10.5)
WBC # FLD AUTO: 4.84 K/UL — SIGNIFICANT CHANGE UP (ref 3.8–10.5)

## 2021-06-11 PROCEDURE — 93320 DOPPLER ECHO COMPLETE: CPT | Mod: 26

## 2021-06-11 PROCEDURE — 93325 DOPPLER ECHO COLOR FLOW MAPG: CPT | Mod: 26

## 2021-06-11 PROCEDURE — 93312 ECHO TRANSESOPHAGEAL: CPT | Mod: 26

## 2021-06-11 PROCEDURE — 76377 3D RENDER W/INTRP POSTPROCES: CPT | Mod: 26

## 2021-06-11 RX ORDER — CLOPIDOGREL BISULFATE 75 MG/1
1 TABLET, FILM COATED ORAL
Qty: 21 | Refills: 0
Start: 2021-06-11 | End: 2021-07-01

## 2021-06-11 RX ORDER — ATORVASTATIN CALCIUM 80 MG/1
1 TABLET, FILM COATED ORAL
Qty: 30 | Refills: 0
Start: 2021-06-11 | End: 2021-07-10

## 2021-06-11 RX ORDER — ASPIRIN/CALCIUM CARB/MAGNESIUM 324 MG
1 TABLET ORAL
Qty: 90 | Refills: 0
Start: 2021-06-11 | End: 2021-09-08

## 2021-06-11 RX ADMIN — CLOPIDOGREL BISULFATE 75 MILLIGRAM(S): 75 TABLET, FILM COATED ORAL at 12:15

## 2021-06-11 RX ADMIN — Medication 81 MILLIGRAM(S): at 12:09

## 2021-06-11 RX ADMIN — ENOXAPARIN SODIUM 40 MILLIGRAM(S): 100 INJECTION SUBCUTANEOUS at 12:15

## 2021-06-11 NOTE — DISCHARGE NOTE NURSING/CASE MANAGEMENT/SOCIAL WORK - NSDCPEEMAIL_GEN_ALL_CORE
[facility] for Tobacco Control email [email or web address]

## 2021-06-11 NOTE — DISCHARGE NOTE NURSING/CASE MANAGEMENT/SOCIAL WORK - NSDCPEWEB_GEN_ALL_CORE
[facility] for Tobacco Control website --- [email or web address] website --- [email or web address]

## 2021-06-11 NOTE — PRE-ANESTHESIA EVALUATION ADULT - NSANTHASARD_GEN_ALL_CORE
Called patient to inform him that lab tests including celiac and GI path were neg.  C diff unable to be done due to formed stool.      Discussed with patient about undergoing FODMAP diet and will have staff contact him to coordinate an appointment with Esther for further education in regards to the FODMAP.     2

## 2021-06-11 NOTE — PROGRESS NOTE ADULT - SUBJECTIVE AND OBJECTIVE BOX
THE PATIENT WAS SEEN AND EXAMINED BY ME WITH THE HOUSESTAFF AND STROKE TEAM DURING MORNING ROUNDS.     HPI:  Chema Valdovinos is a 41 year old RH male with no reported past medical history who is presented for dizziness and a headache. At baseline he ambulates without assistive devices is oriented to all modalities and works. 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 He stated that he felt normal at 1100. He stated that he has had dizziness in the past several months ago which was similar in nature. He stated that his dizziness is persistent and he describes it as a room spinning sensation. He denied any nausea or vomiting. Noted that he felt that he was so dizzy that he needed to lie down to make himself feel better. Stated that he felt that his balance was off but he denied sensations that he was going to fall and denied falls or head trauma. Describes his headache as a pressure like headache which is on the R side of his head, "like my head is in a vice". Denied photophobia/phonophobia. Denied recent illnesses or sick contacts. Does not take any medications.   NIHSS: 3 MRS: 0    SUBJECTIVE: No events overnight.  No new neurologic complaints.  ROS reported negative unless otherwise noted.    aspirin enteric coated 81 milliGRAM(s) Oral daily  atorvastatin 40 milliGRAM(s) Oral at bedtime  clopidogrel Tablet 75 milliGRAM(s) Oral daily  enoxaparin Injectable 40 milliGRAM(s) SubCutaneous daily  sodium chloride 0.9%. 1000 milliLiter(s) IV Continuous <Continuous>      PHYSICAL EXAM:   Vital Signs Last 24 Hrs  T(C): 36.9 (11 Jun 2021 09:59), Max: 37.1 (11 Jun 2021 00:00)  T(F): 98.5 (11 Jun 2021 07:28), Max: 98.7 (11 Jun 2021 00:00)  HR: 60 (11 Jun 2021 09:59) (56 - 73)  BP: 125/80 (11 Jun 2021 09:59) (110/69 - 126/87)  BP(mean): 90 (11 Jun 2021 09:59) (79 - 97)  RR: 16 (11 Jun 2021 09:59) (14 - 20)  SpO2: 100% (11 Jun 2021 09:59) (97% - 100%)    General: No acute distress  HEENT: EOM intact, visual fields full  Abdomen: Soft, nontender, nondistended   Extremities: No edema    NEUROLOGICAL EXAM:  Mental status: Awake, alert, oriented x3, no aphasia, no neglect, normal memory   Cranial Nerves: No facial asymmetry, no nystagmus, no dysarthria,  tongue midline  Motor exam: Normal tone, no drift, 5/5 RUE, 5/5 RLE, 5/5 LUE, 5/5 LLE, normal fine finger movements.  Sensation: Intact to light touch   Coordination/ Gait: No dysmetria, LUTHER intact and symmetric bilaterally    LABS:                        15.5   4.84  )-----------( 232      ( 11 Jun 2021 06:06 )             44.9    06-11    138  |  104  |  15  ----------------------------<  108<H>  3.9   |  21<L>  |  0.93    Ca    9.7      11 Jun 2021 06:06  Phos  3.0     06-10  Mg     2.2     06-10    TPro  6.7  /  Alb  4.1  /  TBili  0.9  /  DBili  x   /  AST  23  /  ALT  23  /  AlkPhos  52  06-10  PT/INR - ( 09 Jun 2021 14:16 )   PT: 12.8 sec;   INR: 1.07 ratio         PTT - ( 09 Jun 2021 14:16 )  PTT:30.2 sec      IMAGING: Reviewed by me.     (06.09.21)  HEAD CT: Mild volume loss, microvascular disease, no acute hemorrhage or midline shift.  Suspected old tiny lacunar infarct in the left thalamus.  (06.09.21)  CTA COW:  Patent intracranial circulation without flow limiting stenosis.  CTA NECK: Patent, ECAs, ICAs, no  hemodynamically significant stenosis at  ICA origins by NASCET criteria.  Bilateral vertebral arteries are patent without flow limiting stenosis.    MR Head No Cont (06.10.21)  There are several tiny scattered subtle foci of apparent diffusion restriction in the left temporal lobe, left brainstem and left brachium pontis which are suspicious for small scattered acute infarcts.

## 2021-06-11 NOTE — DISCHARGE NOTE NURSING/CASE MANAGEMENT/SOCIAL WORK - PATIENT PORTAL LINK FT
You can access the FollowMyHealth Patient Portal offered by [facility] by registering at the following website: [email or web address]. By joining [employer]’s FollowMyHealth portal, you will also be able to view your health information using other applications (apps) compatible with our system.

## 2021-06-12 LAB
B2 GLYCOPROT1 AB SER QL: NEGATIVE — SIGNIFICANT CHANGE UP
B2 GLYCOPROT1 AB SER QL: NEGATIVE — SIGNIFICANT CHANGE UP
PROT S FREE PPP-ACNC: 80 % — SIGNIFICANT CHANGE UP (ref 63–140)

## 2021-06-14 LAB
DNA PLOIDY SPEC FC-IMP: SIGNIFICANT CHANGE UP
PTR INTERPRETATION: SIGNIFICANT CHANGE UP

## 2021-06-15 LAB
CARDIOLIPIN IGM SER-MCNC: 17.8 MPL — HIGH (ref 0–12.5)
CARDIOLIPIN IGM SER-MCNC: <5 GPL — SIGNIFICANT CHANGE UP (ref 0–12.5)
DEPRECATED CARDIOLIPIN IGA SER: <5 APL — SIGNIFICANT CHANGE UP (ref 0–12.5)

## 2021-06-17 LAB
MTHFR GENE MUT ANL BLD/T: SIGNIFICANT CHANGE UP
PROT S FREE PPP-ACNC: 75 % — SIGNIFICANT CHANGE UP (ref 63–140)

## 2021-06-21 PROBLEM — Z00.00 ENCOUNTER FOR PREVENTIVE HEALTH EXAMINATION: Status: ACTIVE | Noted: 2021-06-21

## 2021-06-22 ENCOUNTER — RX RENEWAL (OUTPATIENT)
Age: 41
End: 2021-06-22

## 2021-06-22 RX ORDER — ATORVASTATIN CALCIUM 40 MG/1
40 TABLET, FILM COATED ORAL
Qty: 90 | Refills: 3 | Status: ACTIVE | COMMUNITY
Start: 2021-06-22 | End: 1900-01-01

## 2021-06-23 PROCEDURE — 83090 ASSAY OF HOMOCYSTEINE: CPT

## 2021-06-23 PROCEDURE — 85025 COMPLETE CBC W/AUTO DIFF WBC: CPT

## 2021-06-23 PROCEDURE — 70498 CT ANGIOGRAPHY NECK: CPT

## 2021-06-23 PROCEDURE — 81241 F5 GENE: CPT

## 2021-06-23 PROCEDURE — 93970 EXTREMITY STUDY: CPT

## 2021-06-23 PROCEDURE — 93306 TTE W/DOPPLER COMPLETE: CPT

## 2021-06-23 PROCEDURE — 36415 COLL VENOUS BLD VENIPUNCTURE: CPT

## 2021-06-23 PROCEDURE — 85303 CLOT INHIBIT PROT C ACTIVITY: CPT

## 2021-06-23 PROCEDURE — 70496 CT ANGIOGRAPHY HEAD: CPT

## 2021-06-23 PROCEDURE — 82962 GLUCOSE BLOOD TEST: CPT

## 2021-06-23 PROCEDURE — 93320 DOPPLER ECHO COMPLETE: CPT

## 2021-06-23 PROCEDURE — 99291 CRITICAL CARE FIRST HOUR: CPT | Mod: 25

## 2021-06-23 PROCEDURE — 80061 LIPID PANEL: CPT

## 2021-06-23 PROCEDURE — 83735 ASSAY OF MAGNESIUM: CPT

## 2021-06-23 PROCEDURE — 92523 SPEECH SOUND LANG COMPREHEN: CPT

## 2021-06-23 PROCEDURE — 80053 COMPREHEN METABOLIC PANEL: CPT

## 2021-06-23 PROCEDURE — 74177 CT ABD & PELVIS W/CONTRAST: CPT

## 2021-06-23 PROCEDURE — 93312 ECHO TRANSESOPHAGEAL: CPT

## 2021-06-23 PROCEDURE — 85613 RUSSELL VIPER VENOM DILUTED: CPT

## 2021-06-23 PROCEDURE — 76377 3D RENDER W/INTRP POSTPROCES: CPT

## 2021-06-23 PROCEDURE — 85610 PROTHROMBIN TIME: CPT

## 2021-06-23 PROCEDURE — 85307 ASSAY ACTIVATED PROTEIN C: CPT

## 2021-06-23 PROCEDURE — 84100 ASSAY OF PHOSPHORUS: CPT

## 2021-06-23 PROCEDURE — 70450 CT HEAD/BRAIN W/O DYE: CPT

## 2021-06-23 PROCEDURE — 86147 CARDIOLIPIN ANTIBODY EA IG: CPT

## 2021-06-23 PROCEDURE — 93325 DOPPLER ECHO COLOR FLOW MAPG: CPT

## 2021-06-23 PROCEDURE — 71260 CT THORAX DX C+: CPT

## 2021-06-23 PROCEDURE — 70551 MRI BRAIN STEM W/O DYE: CPT

## 2021-06-23 PROCEDURE — 85730 THROMBOPLASTIN TIME PARTIAL: CPT

## 2021-06-23 PROCEDURE — 81240 F2 GENE: CPT

## 2021-06-23 PROCEDURE — 97161 PT EVAL LOW COMPLEX 20 MIN: CPT

## 2021-06-23 PROCEDURE — 83036 HEMOGLOBIN GLYCOSYLATED A1C: CPT

## 2021-06-23 PROCEDURE — 97165 OT EVAL LOW COMPLEX 30 MIN: CPT

## 2021-06-23 PROCEDURE — 84484 ASSAY OF TROPONIN QUANT: CPT

## 2021-06-23 PROCEDURE — 86146 BETA-2 GLYCOPROTEIN ANTIBODY: CPT

## 2021-06-23 PROCEDURE — 85306 CLOT INHIBIT PROT S FREE: CPT

## 2021-06-23 PROCEDURE — 80307 DRUG TEST PRSMV CHEM ANLYZR: CPT

## 2021-06-23 PROCEDURE — U0003: CPT

## 2021-06-23 PROCEDURE — 85027 COMPLETE CBC AUTOMATED: CPT

## 2021-06-23 PROCEDURE — 85300 ANTITHROMBIN III ACTIVITY: CPT

## 2021-06-23 PROCEDURE — 80048 BASIC METABOLIC PNL TOTAL CA: CPT

## 2021-06-23 PROCEDURE — 81291 MTHFR GENE: CPT

## 2021-06-24 ENCOUNTER — RX RENEWAL (OUTPATIENT)
Age: 41
End: 2021-06-24

## 2021-07-09 ENCOUNTER — TRANSCRIPTION ENCOUNTER (OUTPATIENT)
Age: 41
End: 2021-07-09

## 2021-12-09 NOTE — ED ADULT NURSE NOTE - NS_SISCREENINGSR_GEN_ALL_ED
Head,  normocephalic,  atraumatic,  Face,  Face within normal limits,  Ears,  External ears within normal limits, 
Negative

## 2022-02-14 ENCOUNTER — TRANSCRIPTION ENCOUNTER (OUTPATIENT)
Age: 42
End: 2022-02-14

## 2023-05-30 ENCOUNTER — NON-APPOINTMENT (OUTPATIENT)
Age: 43
End: 2023-05-30

## 2023-08-29 ENCOUNTER — NON-APPOINTMENT (OUTPATIENT)
Age: 43
End: 2023-08-29

## 2023-08-30 NOTE — STROKE CODE NOTE - NSSTROKETPADOOR_REFUSAL
Worker's Compensation Initial Office Visit    Date of Visit:  8/31/2023  Employer:  [employer] ( [employer])  Date of Injury:  8/29/2023    CC:    Chief Complaint   Patient presents with   • Worker's Compensation     WRK - HEAD LACERATION DOI 8 29 23 - [employer]    • Office Visit       HISTORY OF PRESENT INJURY/ILLNESS  Deven Monaco is a 26 year old male, who presents with a complaint of an injury/illness that reportedly occurred during work activities.  He works at [employer] ( [employer]) as an .     Mechanism of Injury:  Patient states that the initial date of injury was on 8/29/2023.  At that time, patient states that he was at work. Patient says sustained injury to L eyebrow.   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 No LOC, no headache or dizziness. No vision changes or eye pain. No excessive bleeding.     Pt evaluated in  on DOI. Laceration repair performed placing 3 simple interrupted sutures. Pt discharged to home with wound care instructions. Tetanus was not in need of updating.     Pt arrives to clinic today reporting mild soreness at the site, no other symptoms. Feels wound is healing well. He denies any headaches, dizziness, vision changes. No nausea or vomiting. He feels well. He is following all wound care instructions.    He denies any other precipitating event or activity.  He has no history of similar problems unless otherwise mentioned above.    CURRENT MEDICATIONS:  Medications relevant to this injury include:  See HPI unless otherwise listed below.    PAST HISTORY:   I have reviewed the patient's medications and allergies, past medical, surgical, social and family history, updating these as appropriate.  See Histories section of the EMR for a display of this information.    REVIEW OF SYSTEMS:  See HPI    PHYSICAL EXAM     Visit Vitals  Pulse 74   SpO2 99%       GENERAL: Pt alert and oriented x3,  appears well, non-toxic and in no acute distress. Non-diaphoretic.   SKIN: No central or peripheral cyanosis. Good color. + 1.2 vertical laceration of L eyebrow midline with 3 sutures in-tact; no redness, warmth, swelling, induration, discharge or bleeding of wound  HEENT: normocephalic; negative peres sign and racoon sign; no swelling, bruising or hematoma of the facial bones; + mild tenderness localized to wound site, no other facial bone tenderness; conjunctiva clear; sclera clear, anterior chambers clear; PERRLA; EOM intact without pain or signs of entrapment  NEUROLOGICAL: CN II-XII intact, normal gait    PROCEDURE         ASSESSMENT & PLAN     DIAGNOSIS:   1. Laceration of left eyebrow, initial encounter    2. Visit for wound check    3. Contusion of face, initial encounter      STATUS: This injury is determined to be WORK RELATED.    RETURN TO WORK: Employee may return to work without restrictions.  Return Date: 8/31/2023            RESTRICTIONS:  No Restrictions    TREATMENT PLAN:   Medications for this injury/condition: OTC Tylenol as needed per label instructions.  Referral/Consult: None  Diagnostic Testing: None       Instructions: Wash daily with mild soap, dry off well. Keep wound clean, dry and covered. Apply topical antibiotic ointment daily. If dressing gets wet or soiled, change immediately.       NEXT RETURN VISIT:  Wednesday Sept 6th for suture removal    Anticipated Maximum Medical Improvement:  5-7 days        Catrachita Callejas PA-C  8/31/2023      The physician below agrees with the plan and restrictions placed on the patient by the provider above.  Cande Denton MD     Initial refusal

## 2023-09-18 NOTE — PRE-ANESTHESIA EVALUATION ADULT - BSA (M2)
Labs reviewed via portal and are stable.  Patient will repeat labs on 12/4/23 per protocol.       ----- Message from Shaniqua Matias MD sent at 9/15/2023  4:52 PM CDT -----  The Labs are stable - please let patient know.   1.93

## 2023-11-29 NOTE — PROGRESS NOTE ADULT - ASSESSMENT
She has not been taking it because one of her doctors said she did not need this. Will have labs today.     Check lipid panel    ASSESSMENT:  42 yo RH M with no hx p/w HA, dizziness and subtle R dysmetria. NIHSS3. CTH with L thalalmic hyupodensity, CTA H/N and CTP unremarkable. tpa offered but after discussion with family patient refused. Utox neg. this AM back to baseline   possible small  vessel infarct vs tia      NEURO: Continue close monitoring for neurologic deterioration, permissive HTN, titrate statin to LDL goal less than 70, MRI Brain w/o, MRA Head w/o and Neck w/contrast. Physical therapy/OT/Speech eval/treatment.     ANTITHROMBOTIC THERAPY: Dual antiplatelet therapy with ASA 81mg PO daily and Clopidogrel 75mg PO daily x 3 weeks followed by monotherapy with ASA 81mg PO daily.    PULMONARY: CXR clear, protecting airway, saturating well     CARDIOVASCULAR: check TTE, cardiac monitoring                              SBP goal:     GASTROINTESTINAL:  dysphagia screen       Diet:     RENAL: BUN/Cr stable, good urine output      Na Goal: Greater than 135     Restrepo:    HEMATOLOGY: H/H stable, Platelets normal      DVT ppx: Heparin s.c [] LMWH []     ID: afebrile, no leukocytosis     OTHER:     DISPOSITION: Rehab or home depending on PT eval once stable and workup is complete      CORE MEASURES:        Admission NIHSS:      TPA: [] YES [] NO      LDL/HDL:     Depression Screen:      Statin Therapy:     Dysphagia Screen: [] PASS [] FAIL     Smoking [] YES [] NO      Afib [] YES [] NO     Stroke Education [] YES [] NO    Obtain screening lower extremity venous ultrasound in patients who meet 1 or more of the following criteria as patient is high risk for DVT/PE on admission:   [] History of DVT/PE  []Hypercoagulable states (Factor V Leiden, Cancer, OCP, etc. )  []Prolonged immobility (hemiplegia/hemiparesis/post operative or any other extended immobilization)  [] Transferred from outside facility (Rehab or Long term care)  [] Age </= to 50 ASSESSMENT:  40 yo RH M with no hx p/w HA, dizziness and subtle R dysmetria. NIHSS3. CTH with L thalalmic hyupodensity, CTA H/N and CTP unremarkable. tpa offered but after discussion with family patient refused. Utox neg.  Neurologically appears back to baseline . MRI demonstrated several tiny scattered cerebral multi-hemispheric foci concerning for acute ischemia. Etiology, embolic stroke of undetermined source.     NEURO: neurologically overall improved, Continue close monitoring for neurologic deterioration, normotension being tolerated, titrate statin to LDL goal less than 70, MRI as noted. Physical therapy/OT: Home     ANTITHROMBOTIC THERAPY: Dual antiplatelet therapy with ASA 81mg PO daily and Clopidogrel 75mg PO daily x 3 weeks followed by monotherapy with ASA 81mg PO daily.    PULMONARY:  protecting airway, saturating well     CARDIOVASCULAR:  TTE: ef 60-65%, minimal MR, mild TR, minimal MT, ESSENCE pending , cardiac monitoring with long term event monitoring to screen for atrial fibrillation                            SBP goal: normotension     GASTROINTESTINAL:  dysphagia screen passed, tolerating diet        Diet: regular     RENAL: BUN/Cr without acute change, maintain adequate hydration, good urine output      Na Goal: Greater than 135     Restrepo:    HEMATOLOGY: H/H without anemia, no active bleeding noted, Platelets 232, hypercoagulable panel pending, CT CAP to screen for occult arrhythmia demonstrated 0.cm RUL groundglass nodule- recommend outpatient pulmonary follow up and monitoring, no acute findings otherwise noted. LE duplex without evidence of DVT.      DVT ppx: Heparin s.c [] LMWH [x]     ID: afebrile, no leukocytosis , no si/sx of infection     OTHER: condition/plan of care d/w patient. Questions and concerns addressed by team.     DISPOSITION: Home with outpatient therapy per.       CORE MEASURES:        Admission NIHSS:  2     TPA: [] YES [x] NO      LDL/HDL: 108/40     Depression Screen: 0     Statin Therapy: y      Dysphagia Screen: [x] PASS [] FAIL     Smoking [] YES [x] NO      Afib [] YES [x] NO     Stroke Education [x] YES [] NO    Obtain screening lower extremity venous ultrasound in patients who meet 1 or more of the following criteria as patient is high risk for DVT/PE on admission:   [] History of DVT/PE  []Hypercoagulable states (Factor V Leiden, Cancer, OCP, etc. )  []Prolonged immobility (hemiplegia/hemiparesis/post operative or any other extended immobilization)  [] Transferred from outside facility (Rehab or Long term care)  [x] Age </= to 50 17-Jan-2020 22:00

## 2024-01-01 NOTE — PHYSICAL THERAPY INITIAL EVALUATION ADULT - HEALTH SCREEN CRITERIA
----- Message from Shy Laguerre MA sent at 2024  2:30 PM EDT -----  Regarding: MEDICAL QUESTION  Contact: [phone]  Dad called- pt on Pepcid, eating pureed food- maybe gaining weight, more fussy when she lays down.  Meds increased?  Please call Dad.     yes

## 2024-04-15 NOTE — SPEECH LANGUAGE PATHOLOGY EVALUATION - SLP GENERAL OBSERVATIONS
Occupational Therapy  Occupational Therapy Orthopedic Evaluation    Patient Name: Silver Villalpando  MRN: [id number]  Today's Date: 4/15/2024  Time Calculation  Start Time: 0815  Stop Time: 0848  Time Calculation (min): 33 min    Insurance:  Visit number:  3       Insurance Type: Payor: ANTH [employer]ARE / Plan: AN[employer]M MEDICARE ADVANTAGE / Product Type: *No Product type* /       General:  Reason for visit: L index finger  Referred by: Dr. Vo    Current Problem  1. Trigger index finger of left hand          Time:  Time Calculation  Start Time: 0815  Stop Time: 0848  Time Calculation (min): 33 min  OT Modalities Time Entry  Ultrasound Time Entry: 8  OT Therapeutic Procedures Time Entry  Manual Therapy Time Entry: 10  Therapeutic Exercise Time Entry: 15      Insurance Type: Payor: ANTHEM MEDICARE / Plan: AN[employer]M MEDICARE ADVANTAGE / Product Type: *No Product type* /       Precautions: per post op protocol       Medical History Form: Reviewed (scanned into chart)    Subjective: It is coming along.  Chief Complaint: L index finger  Onset: ongoing  DOI/DOS: 03/15/2024  L IF trigger release      Hand Dominance: Right    Current Condition since injury:   same     PAIN     Location: 1/10 L IF  Aggravating Factors: functional use  Relieving Factors: rest    Relevant Information (PMH & Previous Tests/Imaging): Reviewed in chart    Prior Level of Function (PLOF)  Exercise/Physical Activity: golfing  Work/School: retired  Current ADL/IADL Status: homemaking     Patients Living Environment: Reviewed and no concern    Primary Language: English    Pt goals for therapy: improve functional use of R hand for golfing, cooking, cleaning, opening jars, squeezing bottles, cleaning, grasping objects, and various household chores    Red Flags: Do you have any of the following? No  Fever/chills, unexplained weight changes, dizziness/fainting, unexplained change in bowel or bladder functions, unexplained malaise or muscle weakness, night  pain/sweats, numbness or tingling    Objective:  Distance to DPC  IF: 0 cm was 1 cm    Extension/flexion  MCP: 10 was 20-80 was 75  PIP: 5 -98  DIP: 0-70 was 65    Physical Observation: minimally raised scar  Edema: moderate edema    Sensory: altered sensation/circulation  to all tips of digits - ongoing prior to surgery    Outcome Measures:  Quick DASH: 22.72    EDUCATION: home exercise program, plan of care, activity modifications, pain management, and injury pathology       Goals:  Active       OT Goals       OT Goal 1       Start:  04/01/24    Expected End:  04/22/24       Improve digit flexion to composite fist for hold objects in hand.         OT Goal 2       Start:  04/01/24    Expected End:  04/22/24       Improve PIP and MCP extension to 5 degrees for reaching to grasp objects in hand.         OT Goal 3       Start:  04/01/24    Expected End:  05/13/24       Improve Quick DASH to 15%.         OT Goal 4       Start:  04/01/24    Expected End:  05/13/24       Report compliance with home program for improved fucntional use of L hand.         OT Goal 5       Start:  04/01/24    Expected End:  05/13/24       Improve  strength to 85% of unaffected side for holding golf club and grasping heavier household objects.             Plan of care was developed with input and agreement by the patient    Treatments:      Manual Therapy:     8 min  Ultrasound along volar IP of IF at 3.0 MHz and 1.0 w/cm2.  Therapeutic Exercise:   15 min  HEP education and completion: hook fist, straight fist, composite fist, intrinsic plus, active digit extension, intrinsic stretch, composite flexion stretch   Active extension kick ups.  Digit extension stretch with power web.  Intrinsic slides with dowel maria del rosario.  Active composite fist.  Patient issued yellow putty for grasping.      Manual Therapy:     10 min  Therapist performed manual scar mobilization and IASTM.   Therapist performed manual digit flexion and extension  stretches.      Assessment:     Patient doing well overall. ROM improved today. Patient to continue working on end range stretches. Patient to follow up next week.   Plan:      Planned Interventions include: therapeutic exercise, therapeutic activity, self-care home management, manual therapy, therapeutic activities, gait training, neuromuscular coordination, vasopneumatic, dry needling, aquatic therapy, electric stimulation, fluidotherapy, ultrasound, kinesiotaping, orthosis fabrication, wound care  Frequency: 1 x Week  Duration: 6 Weeks      Geoffrey Bundy, OT   Pt was received OOB in [place] [facility] eating breakfast after working with PT/OT. +room air, +tele (VSS throughout evaluation). Pt was AAOx4, pleasant, and cooperative.

## 2024-06-20 NOTE — STROKE CODE NOTE - IV ALTEPASE ADMIN HIDDEN
[facility] -  Established Patient Evaluation    Chief Complaint  No chief complaint on file.          4/2/2024     1:05 PM 5/22/2023     1:55 PM 3/14/2023     2:10 PM   Last 3 PDI Scores   Pain Disability Index (PDI) 36 11 22     Interval Update 06/21/2024: Patient return to clinic for follow up on back pain.  She has status post a lumbar SOPHY targeting L5-S1 on 05/22/2024 reporting 30% relief of her symptoms her daughter who is present with her did report that she was able to walk around her home without using her walker following this injection.  Despite the percentage of relief being low.  Continues to suffer from chronic right low back and right leg pain her pain starts in the low right low back radiating into her right buttocks wrapping around to the front of her right leg into her toes.  She denies any recent incident or trauma denies any falls denies any profound weakness.  She was also previously provided a right-sided sacral lateral branch RFA and was scheduled to have the left side RFA done however her right sciatic pain has taking over her SI joint/hip pain.  I will focus my energy on trying to alleviate her right-sided sciatica type pain.  She denies taking gabapentin anymore as she felt like the medicine was causing her ankles swell.  Today she denies any profound weakness denies any bowel bladder dysfunction denies saddle anesthesia at this time.    Interval History 05/02/2024  Bhavna Landry presents today virtually for follow up her pain back.  She recently underwent right sided sacral lateral branch RFA and was scheduled to have left sided RFA performed, but her sciatica pain has flared up in the interim.  She would like to hold off on the left sacral lateral branch RFA as her right-sided radicular symptoms are more severe.  Pain radiates down the right lower extremity to the top of the foot.  She needs to use a walker to ambulate due to pain.  She has a history of  lumbar radiculopathy and underwent L5/S1 interlaminar SOPHY in 08/2023 with excellent results.  She reports her current radicular symptoms are the same as prior.  She would like to repeat the lumbar epidural steroid injection.  She is currently taking gabapentin which is prescribed by her psychiatrist.  She denies any weakness in the lower extremities, saddle anesthesia, or loss of bowel or bladder function.  She currently rates her pain 8/10.       Interval History 04/02/2024:  Bhavna Landry presents today for follow up of her low back pain.  Her low back/sacral pain has returned.  In the past she has responded well to bilateral L5 Dorsal Ramus and Lateral Branches of S1, S2, and S3 RFA.  This is previously given her significant relief lasting several years.  She would like to repeat this procedure.  Denies any recent falls or trauma.  No changes to her pain.  Denies any radicular symptoms.  No weakness, saddle anesthesia, bowel or bladder changes.     Interval History (05/22/2023):  Bhavna Landry returns today for follow up she is s/p a a diagnostic Lumbar MBB targeting L4/5 and L5/S1 that  provided 0% relief of her low back and leg pain R>L. She would like to consider other injection that may help. She denies and new pain, denies B/B dysfunction, denies any profound weakness.     Current Pain Scales:  Current: 5/10              Typical Range: 5-9/10     Interval History (03/14/2023):  Bhavna Landry returns today for low back pain that radiates into the b/l thighs.  Pain is described as clenching pain that has worsened over last 2 years. No trauma or surgery.  Pain is worse with extension.  Pain is better with heat, lying down, gabapentin, tylenol and tramadol.  She is scheduled to start PT next week.  Currently, the bilateral hip and bilateral leg pain is stable.  Avoid NSAIDs due to history of gastric ulcer.     Current Pain Scales:  Current: 7/10              Typical Range: 7-8/10     Background from Chart  "Review  9/16/2020- Mrs. Landry presents to clinic today reporting left hip pain for the past 3-4 mos, which has not improved with PT.  Pain starts in the lateral left hip "on the bone" and radiates through the buttock, down the lateral thigh not passing the knee, and into the anterolateral thigh.  She endorses regular stretching and states that this pain is distinctly different from the SI pain for which she received SI RFA in the past.     6/09/2020-   68-year-old female presents status post left then right  DR 5+ lateral branches of the S1, S2 and S3 RFA with reported 100% continued  relief she reports that her pain score today is 0/10.  She reports improvement with her ADLs and overall improvement of her functionality.     05/05/2020  presents tele-medicine appointment for a follow-up appointment for low back, left groin and bilateral hip pain.  Since the last visit, Bhavna Landry states the pain has been persistant. Current pain intensity is 9/10.  Patient reports onset of pain 2 weeks, patient states bending forward and sitting for long periods of time increase her pain.  Pain is described as aching.  She reports no radicular symptoms in either leg.  Worst pain is not out of 10.  Patient is status post right and left L5 Dorsal Ramus and Lateral Branches of S1, S2, and S3 (4 levels) RFA reported 80-90% relief for a minimal 6 months.  Pain is now returned and patient like to reschedule procedure.        Treatment History  PT/OT: yes  HEP: yes  TENS:    Injections:   -05/22/2024 Lumbar Interlaminar Epidural Steroid Injection L5/S1 30%  Bilateral SI joint injections, bilateral  Dorsal ramus block(DR5, S1,S2,S3),  bilateral L5 Dorsal Ramus and Lateral Branches of S1, S2, and S3 (4 levels) RFA, GTB injections   12/08/2023 - Cervical Interlaminar Epidural Steroid Injection C6/C7 under Fluoroscopic Guidance   08//02/2023 -  Lumbar Interlaminar Epidural Steroid Injection L5/S1   05/03/2023 -  Diagnostic Bilateral L3, " L4, and L5 Lumbar Medial Branch Block under Fluoroscopy  - No relief    Surgery:  Medications:   - NSAIDS: avoid due to gastric ulcer   - MSK Relaxants:   - TCAs:   - SNRIs: Venlafaxine  - Topicals: Voltaren  - Opioids: Tramadol   - Anticonvulsants: Gabapentin    Current Pain Medications  Gabapentin  Tramadol      Full Medication List    Current Outpatient Medications:     albuterol (PROVENTIL) 2.5 mg /3 mL (0.083 %) nebulizer solution, Take 3 mLs (2.5 mg total) by nebulization every 6 (six) hours as needed for Wheezing. Rescue, Disp: 90 each, Rfl: 2    albuterol (PROVENTIL/VENTOLIN HFA) 90 mcg/actuation inhaler, USE 2 INHALATIONS BY MOUTH 4  TIMES DAILY AS NEEDED, Disp: 34 g, Rfl: 3    amLODIPine (NORVASC) 5 MG tablet, Take 1 tablet (5 mg total) by mouth 2 (two) times a day., Disp: 180 tablet, Rfl: 1    ascorbic acid, vitamin C, (VITAMIN C) 500 MG tablet, Take 500 mg by mouth once daily., Disp: , Rfl:     aspirin (ECOTRIN) 81 MG EC tablet, Take 81 mg by mouth once daily., Disp: , Rfl:     atorvastatin (LIPITOR) 80 MG tablet, TAKE 1 TABLET(80 MG) BY MOUTH EVERY DAY, Disp: 90 tablet, Rfl: 0    baclofen (LIORESAL) 10 MG tablet, 1 tab po tid for muscle cramps., Disp: 60 tablet, Rfl: 1    benztropine (COGENTIN) 0.5 MG tablet, TAKE 1 TABLET(0.5 MG) BY MOUTH TWICE DAILY, Disp: 180 tablet, Rfl: 3    budesonide-formoterol 80-4.5 mcg (SYMBICORT) 80-4.5 mcg/actuation HFAA, Inhale 2 puffs into the lungs 2 (two) times a day., Disp: 6.9 g, Rfl: 11    ciclopirox (PENLAC) 8 % Soln, Apply topically nightly. Paint on affected nail(s) once per night, remove residue once per week with alcohol, Disp: 6.6 mL, Rfl: 3    cloNIDine (CATAPRES) 0.1 MG tablet, 1 tab po q day for systolic BP > 160 or DBP >100., Disp: 30 tablet, Rfl: 1    exemestane (AROMASIN) 25 mg tablet, Take 1 tablet (25 mg total) by mouth once daily., Disp: 30 tablet, Rfl: 11    fluticasone (VERAMYST) 27.5 mcg/actuation nasal spray, 2 sprays by Nasal route as needed for  Rhinitis., Disp: , Rfl:     fluticasone propionate (FLONASE) 50 mcg/actuation nasal spray, 1 spray by Each Nostril route once daily., Disp: , Rfl:     folic acid (FOLVITE) 1 MG tablet, Take 1 tablet (1,000 mcg total) by mouth once daily., Disp: 90 tablet, Rfl: 3    gabapentin (NEURONTIN) 100 MG capsule, Take 1 capsule (100 mg total) by mouth 3 (three) times daily., Disp: 270 capsule, Rfl: 3    HYDROcodone-acetaminophen (NORCO) 5-325 mg per tablet, Take 1 tablet by mouth every 12 (twelve) hours as needed for Pain., Disp: 60 tablet, Rfl: 0    isosorbide mononitrate (IMDUR) 30 MG 24 hr tablet, Take 1 tablet (30 mg total) by mouth once daily., Disp: 30 tablet, Rfl: 3    LIDOcaine (LIDODERM) 5 %, Place 1 patch onto the skin once daily. Remove & Discard patch within 12 hours or as directed by MD, Disp: 14 patch, Rfl: 0    losartan (COZAAR) 25 MG tablet, TAKE 1 TABLET EVERY DAY AS NEEDED FOR.WHEN SYSTOLIC BLOOD PRESSURE ABOVE 150, Disp: 90 tablet, Rfl: 1    methotrexate 2.5 MG Tab, TAKE 8 TABLETS BY MOUTH EVERY 7 DAYS. TAKE 4 TABLETS MONDAY MORNING AND TAKE 4 TABLETS MOND NIGHT ONLY, Disp: 144 tablet, Rfl: 0    omega-3 fatty acids/fish oil (FISH OIL-OMEGA-3 FATTY ACIDS) 300-1,000 mg capsule, Take by mouth once daily., Disp: , Rfl:     omeprazole (PRILOSEC) 20 MG capsule, TAKE 1 CAPSULE(20 MG) BY MOUTH EVERY DAY, Disp: 30 capsule, Rfl: 11    omeprazole (PRILOSEC) 40 MG capsule, Take 1 capsule (40 mg total) by mouth every morning., Disp: 90 capsule, Rfl: 3    risperiDONE (RISPERDAL) 2 MG tablet, TAKE 1 TABLET(2 MG) BY MOUTH EVERY MORNING, Disp: 90 tablet, Rfl: 3    risperiDONE (RISPERDAL) 4 MG tablet, TAKE 1 TABLET(4 MG) BY MOUTH EVERY EVENING, Disp: 90 tablet, Rfl: 3    venlafaxine (EFFEXOR-XR) 75 MG 24 hr capsule, Take 1 capsule (75 mg total) by mouth once daily., Disp: 90 capsule, Rfl: 3    vitamin D (VITAMIN D3) 1000 units Tab, Take 1,000 Units by mouth once daily., Disp: , Rfl:     vitamin E 200 UNIT capsule, Take 200  Units by mouth once daily., Disp: , Rfl:     zinc gluconate 50 mg tablet, Take 50 mg by mouth once daily., Disp: , Rfl:   No current facility-administered medications for this visit.    Facility-Administered Medications Ordered in Other Visits:     tropicamide 1% ophthalmic solution 1 drop, 1 drop, Right Eye, On Call Procedure, Karen Song MD, 1 drop at 08/01/19 0648     Review of Systems  ROS  REVIEW OF SYSTEMS:    GENERAL:  No weight loss, malaise or fevers.  HEENT:   No recent changes in vision or hearing  NECK:  No difficulty with swallowing. No stridor.   RESPIRATORY:  Negative for cough, wheezing or shortness of breath, patient denies any recent URI.  CARDIOVASCULAR:  Negative for chest pain, leg swelling or palpitations. +HTN  GI:  Negative for abdominal discomfort, blood in stools or black stools or change in bowel habits.  MUSCULOSKELETAL:  See HPI.  SKIN:  Negative for lesions, rash, and itching.  PSYCH:  No mood disorder or recent psychosocial stressors.   +anxiety +depression   HEMATOLOGY/LYMPHOLOGY:  Negative for prolonged bleeding, bruising easily or swollen nodes.  Patient is not currently taking any anti-coagulants  NEURO:   No history of headaches, syncope, paralysis, seizures or tremors.  All other reviewed and negative other than HPI.      Medical History  Past Medical History:   Diagnosis Date    Allergy     Amblyopia     Anemia     Anticoagulant long-term use     Arthritis 02/02/1992    Carcinoma of upper-outer quadrant of right breast in female, estrogen receptor positive 04/13/2022    Cataract     Depression     Dry eyes     Dry mouth     Duane's syndrome of right eye     Early dry stage nonexudative age-related macular degeneration of both eyes 09/20/2022    Fibromyalgia 04/17/2014    Fibromyalgia     Fractured hip     RIGHT HIP    GERD (gastroesophageal reflux disease)     History of psychiatric hospitalization     Hyperlipidemia 02/02/1992    Hypertension     Hypocalcemia      Hyponatremia     Kidney stone     Migraine headache     Osteoporosis     Pressure ulcer of unspecified site, unspecified stage     Psoriatic arthritis 1992    Recurrent upper respiratory infection (URI)     Right knee pain     post knee replacement surgery (possible rejectiion of metal)    RLS (restless legs syndrome)     Schizophrenia 1992    stable on meds    Sciatica     Squamous cell carcinoma of skin     Urinary tract infection         Surgical History  Past Surgical History:   Procedure Laterality Date    brow ptosis repair Right 2019    Surgeon: Dr. Karla Henson    CATARACT EXTRACTION       SECTION      COLONOSCOPY N/A 2016    Procedure: COLONOSCOPY;  Surgeon: ANDRIA Connelly MD;  Location: [facility] ([address] [place] [address]);  Service: Endoscopy;  Laterality: N/A;    COLONOSCOPY N/A 2022    Procedure: COLONOSCOPY;  Surgeon: Mikey Walters MD;  Location: [facility];  Service: Endoscopy;  Laterality: N/A;    cyst removed from right sinus  1982    EPIDURAL STEROID INJECTION INTO CERVICAL SPINE N/A 2023    Procedure: C6-7 SOPHY;  Surgeon: Spring Jensen MD;  Location: [facility];  Service: Pain Management;  Laterality: N/A;  20 mins    EPIDURAL STEROID INJECTION INTO LUMBAR SPINE N/A 2023    Procedure: Injection-steroid-epidural-lumbar L5-S1;  Surgeon: Chirag Kim MD;  Location: [facility];  Service: Pain Management;  Laterality: N/A;  asa    EPIDURAL STEROID INJECTION INTO LUMBAR SPINE N/A 2024    Procedure: L5/S1 Interlaminar SOPHY;  Surgeon: Maile Storm DO;  Location: [facility];  Service: Pain Management;  Laterality: N/A;  20mins-ASA 5d    ESOPHAGOGASTRODUODENOSCOPY N/A 2023    Procedure: EGD (ESOPHAGOGASTRODUODENOSCOPY);  Surgeon: Dougie Shea MD;  Location: [facility];  Service: Endoscopy;  Laterality: N/A;    FOOT FRACTURE SURGERY      HYSTERECTOMY      VAGINAL HYSTERECTOMY WITHOUT BSO - ENDOMETRIOSIS    INJECTION  FOR SENTINEL NODE IDENTIFICATION Right 05/09/2022    Procedure: INJECTION, FOR SENTINEL NODE IDENTIFICATION-Right;  Surgeon: NEAL Dhillon MD;  Location: [facility] OR;  Service: General;  Laterality: Right;    INJECTION OF ANESTHETIC AGENT AROUND MEDIAL BRANCH NERVES INNERVATING LUMBAR FACET JOINT N/A 04/11/2019    Procedure: Lumbo-sacral Block, DR5 and Lateral Branches of S1,S2, S3;  Surgeon: Michelle Pineda Jr., MD;  Location: [facility];  Service: Pain Management;  Laterality: N/A;  Pt takes  and states she holds ASA on her own whenever she has procedures.  Instructed to hold x 3 days prior to procedure.      INJECTION OF ANESTHETIC AGENT AROUND MEDIAL BRANCH NERVES INNERVATING LUMBAR FACET JOINT Bilateral 05/03/2023    Procedure: Block-nerve-medial branch-lumbar bilateral L3, L4, L5;  Surgeon: Maile Storm DO;  Location: [facility];  Service: Pain Management;  Laterality: Bilateral;  asa    INJECTION OF ANESTHETIC AGENT INTO SACROILIAC JOINT Right 08/30/2018    Procedure: BLOCK, SACROILIAC JOINT-Right- ORAL SEDATION;  Surgeon: Michelle Pineda Jr., MD;  Location: [facility];  Service: Pain Management;  Laterality: Right;    INJECTION OF ANESTHETIC AGENT INTO SACROILIAC JOINT Bilateral 09/27/2018    Procedure: BLOCK, SACROILIAC JOINT-BILATERAL;  Surgeon: Michelle Pineda Jr., MD;  Location: [facility];  Service: Pain Management;  Laterality: Bilateral;  Please keep at 10:00 due to trasnsportation    INJECTION OF ANESTHETIC AGENT INTO SACROILIAC JOINT Bilateral 02/07/2019    Procedure: Bilateral Sacroiliac Joint Injection - Per Dr Pineda, not necessary to hold ASA.;  Surgeon: Michelle Pineda Jr., MD;  Location: [facility];  Service: Pain Management;  Laterality: Bilateral;    INTRAOCULAR PROSTHESES INSERTION Right 08/01/2019    Procedure: INSERTION, IOL PROSTHESIS;  Surgeon: Karen Sogn MD;  [facility];  Service: Ophthalmology;  Laterality: Right;     INTRAOCULAR PROSTHESES INSERTION Left 09/26/2019    Procedure: INSERTION, IOL PROSTHESIS;  Surgeon: Karen Song MD;  Location: [facility] OR [address] [place] [address];  Service: Ophthalmology;  Laterality: Left;    JOINT REPLACEMENT Right     knee    KNEE SURGERY      MASTECTOMY Right 05/09/2022    Procedure: MASTECTOMY-Right;  Surgeon: NEAL Dhillon MD;  Location: [facility] OR;  Service: General;  Laterality: Right;  2.5 HOURS    PHACOEMULSIFICATION OF CATARACT Right 08/01/2019    Procedure: PHACOEMULSIFICATION, CATARACT;  Surgeon: Karen Song MD;  Location: [facility] OR [address] [place] [address];  Service: Ophthalmology;  Laterality: Right;    PHACOEMULSIFICATION OF CATARACT Left 09/26/2019    Procedure: PHACOEMULSIFICATION, CATARACT;  Surgeon: Karen Song MD;  Location: [facility] OR [address] [place] [address];  Service: Ophthalmology;  Laterality: Left;    RADIOFREQUENCY ABLATION, NERVE, PERIPHERAL Right 04/24/2024    Procedure: RIGHT L5 Dorsal Ramus and RIGHT Lateral Branches of S1, S2, and S3;  Surgeon: Maile Storm DO;  Location: [facility];  Service: Pain Management;  Laterality: Right;  30 mins    RADIOFREQUENCY THERMOCOAGULATION Right 05/07/2019    Procedure: RADIOFREQUENCY THERMAL COAGULATION RIGHT DORSAL RAMUS 5 AND LATERAL BRANCH OF S1, S2 AND S3;  Surgeon: Michelle Pineda Jr., MD;  [facility];  Service: Pain Management;  Laterality: Right;    RADIOFREQUENCY THERMOCOAGULATION Left 05/14/2019    Procedure: RADIOFREQUENCY THERMAL COAGULATION LEFT DORSAL RAMUS 5 AND LATERAL BRANCH OF S1,S2 AND S3;  Surgeon: Michelle Pineda Jr., MD;  Location: [facility];  Service: Pain Management;  Laterality: Left;    RADIOFREQUENCY THERMOCOAGULATION Right 10/22/2019    Procedure: RADIOFREQUENCY THERMAL COAGULATION---DARSAL RAMUS 5 and LATERAL S1,S2,and S3 Right;  Surgeon: Michelle Pineda Jr., MD;  Location: [facility] T;  Service: Pain Management;  Laterality: Right;  patient to sign consent DOS    RADIOFREQUENCY THERMOCOAGULATION Left  10/29/2019    Procedure: RADIOFREQUENCY THERMAL COAGULATION - LEFT - DR5, S1,S2, AND S3;  Surgeon: Michelle Pineda Jr., MD;  Location: [facility];  Service: Pain Management;  Laterality: Left;    RADIOFREQUENCY THERMOCOAGULATION Left 05/26/2020    Procedure: RADIOFREQUENCY THERMAL COAGULATION--Left DR5+ lateral branches of S1, S2, S3;  Surgeon: Michelle Pineda Jr., MD;  Location: [facility];  Service: Pain Management;  Laterality: Left;    RADIOFREQUENCY THERMOCOAGULATION Right 06/02/2020    Procedure: RADIOFREQUENCY THERMAL COAGULATION--Right DR5+ lateral branches of S1, S2, S3;  Surgeon: Michelle Pineda Jr., MD;  Location: [facility];  Service: Pain Management;  Laterality: Right;    SENTINEL LYMPH NODE BIOPSY Right 05/09/2022    Procedure: BIOPSY, LYMPH NODE, SENTINEL-Right;  Surgeon: NEAL Dhiloln MD;  Location: [facility];  Service: General;  Laterality: Right;    SINUS SURGERY      Surgery on right knee  07/09/1982    TONSILLECTOMY      tumor removed from back left side upper shoulder  03/17/2006        Physical Exam  There were no vitals filed for this visit. - Virtual Visit     Virtual Visit Physical Exam - 05/02/2024  GEN: No acute distress. Calm, comfortable  HENT: Normocephalic, atraumatic, moist mucous membranes  EYE: Anicteric sclera, non-injected  CV: Non-diaphoretic.  CHEST: Breathing comfortably. Chest expansion symmetric  EXT: No clubbing, cyanosis.   Psych: Mood and affect are appropriate  GAIT: Independent, normal ambulation        Ortho/SPM Exam  PHYSICAL EXAMINATION Prior:    GENERAL: Well appearing, in no acute distress, alert and oriented x3.  PSYCH:  Mood and affect appropriate.  SKIN: Skin color, texture, turgor normal, no rashes or lesions.  HEAD/FACE:  Normocephalic, atraumatic. Cranial nerves grossly intact.  NECK: Normal ROM. Supple.   CV: RRR with palpation of the radial artery.  PULM: No evidence of respiratory difficulty, symmetric chest rise.  GI:  Soft and  non-distended.  MSK: Straight leg raising is  negative to radicular pain. There is pain to palpation over the facet joints of the lumbar spine. There is pain with lumbar facet loading. There is pain over the SI joints. +HELIO. +[place]len. +Sacral Thurst. Normal range of motion without pain reproduction with lumbar flexion. Pain with extension.  Peripheral joint ROM is full and pain free without obvious instability or laxity in all four extremities. No deformities, edema, or skin discoloration.  No atrophy or tone abnormalities are noted.   NEURO: Bilateral upper and lower extremity coordination and strength is symmetric.  No loss of sensation is noted.  MENTAL STATUS: A x O x 3, good concentration, speech is fluent and goal directed  MOTOR: 5/5 in all muscle groups  GAIT: normal.  Ambulates unassisted.    Imaging  MRI LUMBAR SPINE WITHOUT CONTRAST     CLINICAL HISTORY:  low back pain; Sacrococcygeal disorders, not elsewhere classified     TECHNIQUE:  Multiplanar, multisequence MR images were acquired from the thoracolumbar junction to the sacrum without contrast.     COMPARISON:  03/06/2023     FINDINGS:  Alignment: Normal.     Vertebrae: Degenerative endplate changes throughout the lumbar spine.     Discs: Moderate to severe disc height loss throughout the lumbar spine.  No evidence for discitis.     Cord: Conus terminates at L2 and appears unremarkable.  Probable thin fatty filum terminale noted.     Degenerative findings:     T12-L1: Small central protrusion.  No spinal canal stenosis or neural foraminal narrowing.     L1-L2: Circumferential disc bulge with right paracentral/foraminal protrusion result in mild effacement of the thecal sac and moderate right neural foraminal narrowing.     L2-L3: Circumferential disc bulge.  No spinal canal stenosis or neural foraminal narrowing.     L3-L4: Circumferential disc bulge with left paracentral disc extrusion and moderate facet arthropathy result in effacement of the  left lateral recess and severe left neural foraminal narrowing.     L4-L5: Circumferential disc bulge and mild facet arthropathy result in mild effacement of the thecal sac and moderate left, mild right neural foraminal narrowing.     L5-S1: Circumferential disc bulge and moderate facet arthropathy result in mild effacement of the thecal sac and severe right, moderate left neural foraminal narrowing.     Paraspinal muscles & soft tissues: Mild paraspinal muscle atrophy.  Questionable gallbladder wall thickening and small stones.     Impression:     1. Multilevel degenerative changes of the lumbar spine detailed above.  Moderate to severe neural foraminal narrowing at L1-L2 and L3-S1.  2. Questionable gallbladder wall thickening and small stones.  Recommend further evaluation with abdominal ultrasound.        Electronically signed by: Akash Black MD  Date:                                            03/12/2023      Labs:  BMP  Lab Results   Component Value Date     (L) 06/03/2024    K 4.4 06/03/2024     06/03/2024    CO2 27 06/03/2024    BUN 3 (L) 06/03/2024    CREATININE 0.9 06/03/2024    CALCIUM 9.3 06/03/2024    ANIONGAP 8 06/03/2024    EGFRNORACEVR >60.0 06/03/2024     Lab Results   Component Value Date    ALT 13 06/03/2024    AST 19 06/03/2024    GGT 91 (H) 07/28/2014    ALKPHOS 67 06/03/2024    BILITOT 0.2 06/03/2024       Assessment:  Problem List Items Addressed This Visit    None            03/14/2023 -Bhavna Landry is a 72 y.o. female who  has a past medical history of Allergy, Amblyopia, Anemia, Anticoagulant long-term use, Arthritis (02/02/1992), Carcinoma of upper-outer quadrant of right breast in female, estrogen receptor positive (04/13/2022), Cataract, Depression, Dry eyes, Dry mouth, Duane's syndrome of right eye, Early dry stage nonexudative age-related macular degeneration of both eyes (09/20/2022), Fibromyalgia (04/17/2014), Fibromyalgia, Fractured hip, GERD (gastroesophageal reflux  disease), History of psychiatric hospitalization, Hyperlipidemia (02/02/1992), Hypertension, Hypocalcemia, Hyponatremia, Kidney stone, Migraine headache, Osteoporosis, Pressure ulcer of unspecified site, unspecified stage, Psoriatic arthritis (02/02/1992), Recurrent upper respiratory infection (URI), Right knee pain, RLS (restless legs syndrome), Schizophrenia (02/02/1992), Sciatica, Squamous cell carcinoma of skin, and Urinary tract infection.  By history and examination this patient has chronic low back pain without radiculopathy.  The underlying cause cause is facet arthritis and deconditioning.  Pathology is confirmed by imaging.  We discussed the underlying diagnoses and multiple treatment options including non-opioid medications, interventional procedures, physical therapy, and home exercise.  The risks and benefits of each treatment option were discussed and all questions were answered.      5/22/2023- 72 y/o female with a Hx of chronic low back  and bilateral leg pain she is s/p a diagnosis lumbar MBB targeting L4/5 and L5/S1 reporting 0% relief of her symptoms. Her Lumbar MRI multilevel disc bulges starting   at L3 through S1.  She has severe right, moderate left neural foraminal narrowing at L5-S1 thta may be causing her Low back and leg pain.  Her pain was refractory to diagnositic low lumbar MBB so i will attempt a L4-5 Lumbar SOPHY.     04/02/2024 Bhavna Landry presents for follow up of her low back/SI joint pain.  Patient has previously undergone bilateral L5 Dorsal Ramus and Lateral Branches of S1, S2, and S3 RFA with significant and lasting relief.  Prior RFA lasted about 2 years.  She would like to repeat the procedure as the pain has now returned.    05/02/2024 - Patient presents virtually today for follow up of her low back pain.  Today her biggest complaint is radicular symptoms primarily on the right.  Patient has a history of lumbar radiculopathy and previously underwent an L5/S1 epidural  steroid injection in 08/2023 with excellent relief.  Her pain has returned and she would like to repeat the epidural steroid injection.  She would like to hold off on the left-sided sacral RFA for now until we can address her radicular symptoms as they are more severe.  Discussed with the patient that she may benefit from a surgical consult if epidural does not provide persistent relief.    6/21/2024- 71 y/o female presents for continued pain in the right low back right buttocks and right leg that radiates into her right foot. Her recent L5-S1 IESI provided 30% relief of her symptoms.  Her MRI significant for disc bulges throughout the lower lumbar spine L3 through S1 she has severe right moderate left neural foraminal narrowing at L5-S1 that may be causing right low back and right leg pain.  Today we discussed focusing her pain interventions on the right side in effort to reduce some of the right-sided symptoms as she does not complain about pain in the left lumbar or left leg.    Plan & Recommendations  Procedures:  s/f a Right L3/4 and  L4/5 TFESI   Plan to reschedule left L5 Dorsal Ramus and Lateral Branches of S1, S2, and S3 RFA (right-sided has already been completed.)  Medications: Start Lyrica 50 mg QHS no longer taking Gabapentin due to LE swelling.   Imaging: reviewed and discussed in detail  PT/OT/HEP: I encouraged the patient to maintain a home exercise regimen that includes daily, moderate cardiovascular exercise lasting at least 30 minutes.  This may include yoga, gela chi, walking, swimming, aqua aerobics, or other exercises that maintain a heart rate of 50-70% of the calculated maximum heart rate.  I also encouraged light, daily stretching focused on the target area.  Follow Up: RTC 2 weeks post procedure    Daniel Brar NP-C  [facility]      Disclaimer: This note was partly generated using dictation software which may occasionally result in transcription errors.   show

## 2025-01-20 ENCOUNTER — NON-APPOINTMENT (OUTPATIENT)
Age: 45
End: 2025-01-20

## 2025-02-10 ENCOUNTER — NON-APPOINTMENT (OUTPATIENT)
Age: 45
End: 2025-02-10